# Patient Record
Sex: MALE | Race: WHITE | NOT HISPANIC OR LATINO | Employment: FULL TIME | RURAL
[De-identification: names, ages, dates, MRNs, and addresses within clinical notes are randomized per-mention and may not be internally consistent; named-entity substitution may affect disease eponyms.]

---

## 2017-02-27 ENCOUNTER — CONVERSION ENCOUNTER (OUTPATIENT)
Dept: FAMILY MEDICINE CLINIC | Facility: CLINIC | Age: 42
End: 2017-02-27

## 2017-03-07 ENCOUNTER — CONVERSION ENCOUNTER (OUTPATIENT)
Dept: FAMILY MEDICINE CLINIC | Facility: CLINIC | Age: 42
End: 2017-03-07

## 2017-03-20 ENCOUNTER — CONVERSION ENCOUNTER (OUTPATIENT)
Dept: FAMILY MEDICINE CLINIC | Facility: CLINIC | Age: 42
End: 2017-03-20

## 2017-04-12 ENCOUNTER — CONVERSION ENCOUNTER (OUTPATIENT)
Dept: FAMILY MEDICINE CLINIC | Facility: CLINIC | Age: 42
End: 2017-04-12

## 2017-06-12 ENCOUNTER — CONVERSION ENCOUNTER (OUTPATIENT)
Dept: FAMILY MEDICINE CLINIC | Facility: CLINIC | Age: 42
End: 2017-06-12

## 2017-06-13 ENCOUNTER — CONVERSION ENCOUNTER (OUTPATIENT)
Dept: FAMILY MEDICINE CLINIC | Facility: CLINIC | Age: 42
End: 2017-06-13

## 2017-06-20 ENCOUNTER — CONVERSION ENCOUNTER (OUTPATIENT)
Dept: FAMILY MEDICINE CLINIC | Facility: CLINIC | Age: 42
End: 2017-06-20

## 2017-07-17 ENCOUNTER — CONVERSION ENCOUNTER (OUTPATIENT)
Dept: FAMILY MEDICINE CLINIC | Facility: CLINIC | Age: 42
End: 2017-07-17

## 2017-08-02 ENCOUNTER — CONVERSION ENCOUNTER (OUTPATIENT)
Dept: FAMILY MEDICINE CLINIC | Facility: CLINIC | Age: 42
End: 2017-08-02

## 2017-12-06 ENCOUNTER — CONVERSION ENCOUNTER (OUTPATIENT)
Dept: FAMILY MEDICINE CLINIC | Facility: CLINIC | Age: 42
End: 2017-12-06

## 2018-01-08 ENCOUNTER — CONVERSION ENCOUNTER (OUTPATIENT)
Dept: FAMILY MEDICINE CLINIC | Facility: CLINIC | Age: 43
End: 2018-01-08

## 2018-04-16 ENCOUNTER — CONVERSION ENCOUNTER (OUTPATIENT)
Dept: FAMILY MEDICINE CLINIC | Facility: CLINIC | Age: 43
End: 2018-04-16

## 2018-04-23 ENCOUNTER — CONVERSION ENCOUNTER (OUTPATIENT)
Dept: FAMILY MEDICINE CLINIC | Facility: CLINIC | Age: 43
End: 2018-04-23

## 2018-04-26 ENCOUNTER — CONVERSION ENCOUNTER (OUTPATIENT)
Dept: FAMILY MEDICINE CLINIC | Facility: CLINIC | Age: 43
End: 2018-04-26

## 2018-06-12 ENCOUNTER — CONVERSION ENCOUNTER (OUTPATIENT)
Dept: FAMILY MEDICINE CLINIC | Facility: CLINIC | Age: 43
End: 2018-06-12

## 2018-06-17 ENCOUNTER — CONVERSION ENCOUNTER (OUTPATIENT)
Dept: FAMILY MEDICINE CLINIC | Facility: CLINIC | Age: 43
End: 2018-06-17

## 2018-06-20 ENCOUNTER — HOSPITAL ENCOUNTER (OUTPATIENT)
Dept: MRI IMAGING | Facility: HOSPITAL | Age: 43
Discharge: HOME | End: 2018-06-20
Attending: FAMILY MEDICINE | Admitting: FAMILY MEDICINE

## 2018-07-02 ENCOUNTER — CONVERSION ENCOUNTER (OUTPATIENT)
Dept: FAMILY MEDICINE CLINIC | Facility: CLINIC | Age: 43
End: 2018-07-02

## 2018-07-06 ENCOUNTER — TELEPHONE (OUTPATIENT)
Dept: NEUROSURGERY | Facility: CLINIC | Age: 43
End: 2018-07-06

## 2018-07-06 ENCOUNTER — OFFICE VISIT (OUTPATIENT)
Dept: NEUROSURGERY | Facility: CLINIC | Age: 43
End: 2018-07-06

## 2018-07-06 VITALS
BODY MASS INDEX: 39.51 KG/M2 | SYSTOLIC BLOOD PRESSURE: 118 MMHG | HEIGHT: 70 IN | HEART RATE: 72 BPM | DIASTOLIC BLOOD PRESSURE: 70 MMHG | WEIGHT: 276 LBS

## 2018-07-06 DIAGNOSIS — M54.12 CERVICAL RADICULOPATHY: Primary | ICD-10-CM

## 2018-07-06 DIAGNOSIS — Z98.890 POST-OPERATIVE STATE: ICD-10-CM

## 2018-07-06 PROCEDURE — 99243 OFF/OP CNSLTJ NEW/EST LOW 30: CPT | Performed by: NEUROLOGICAL SURGERY

## 2018-07-06 RX ORDER — GABAPENTIN 300 MG/1
300 CAPSULE ORAL 3 TIMES DAILY
Qty: 90 CAPSULE | Refills: 3
Start: 2018-07-06 | End: 2018-07-11

## 2018-07-06 RX ORDER — HYDROCODONE BITARTRATE AND ACETAMINOPHEN 7.5; 325 MG/1; MG/1
1 TABLET ORAL EVERY 6 HOURS PRN
COMMUNITY
Start: 2018-07-02 | End: 2018-07-15 | Stop reason: HOSPADM

## 2018-07-06 RX ORDER — CEFAZOLIN SODIUM IN 0.9 % NACL 3 G/100 ML
3 INTRAVENOUS SOLUTION, PIGGYBACK (ML) INTRAVENOUS ONCE
Status: CANCELLED | OUTPATIENT
Start: 2018-07-13 | End: 2018-07-13

## 2018-07-06 NOTE — PROGRESS NOTES
Subjective   Patient ID: Brad A Schoen is a 42 y.o. male who is being seen for consultation today at the request of Reggie Duane Lyell, MD for cervical stenosis. He had an MRI of the cervical spine at [facility] on 6/20/18. He presents unaccompanied.    History of Present Illness 42 [employer] employee with 3 months of neck issues with some progression of right arm involvement and weakness x 1 month.  No loss of b/b.  He reports no numbness.  He has limited right deltoid function 3-4 weeks.  He also reports severe pain in the arm.  He feels his symptoms are static for the last month.  He has had 2 cher's withut efficacy.  He is taking some narcotics with limited efficacy as well.  He denies NSAID use.  He is not a tobacco user.  His pain is 9/10 today in the clinic.      The following portions of the patient's history were reviewed and updated as appropriate: allergies, current medications, past family history, past medical history, past social history, past surgical history and problem list.    Review of Systems   Constitutional: Negative for chills and fever.   Respiratory: Negative for cough and shortness of breath.    Cardiovascular: Negative for chest pain, palpitations and leg swelling.   Gastrointestinal: Negative for abdominal pain and constipation.   Genitourinary: Negative for difficulty urinating and enuresis. Penile pain: RUE.   Musculoskeletal: Positive for arthralgias (RUE) and neck pain. Negative for back pain and gait problem.   Skin: Negative for rash.   Neurological: Positive for weakness. Negative for numbness (or tingling).   Hematological: Does not bruise/bleed easily.   Psychiatric/Behavioral: Negative for sleep disturbance.       Objective   Physical Exam   Constitutional: He is oriented to person, place, and time.   Neurological: He is oriented to person, place, and time. Gait normal.   Reflex Scores:       Tricep reflexes are 2+ on the right side and 2+ on the left side.       Bicep reflexes are  1+ on the right side.       Brachioradialis reflexes are 2+ on the right side and 2+ on the left side.       Patellar reflexes are 2+ on the right side and 2+ on the left side.       Achilles reflexes are 2+ on the right side and 2+ on the left side.    Neurologic Exam     Mental Status   Oriented to person, place, and time.     Motor Exam   Muscle bulk: normal    Strength   Right deltoid: 3/5  Left deltoid: 5/5  Right biceps: 4/5  Left biceps: 5/5  Right triceps: 5/5  Left triceps: 5/5  Right wrist flexion: 5/5  Left wrist flexion: 5/5  Right interossei: 5/5  Left interossei: 5/5  Right iliopsoas: 5/5  Left iliopsoas: 5/5  Right quadriceps: 5/5  Left quadriceps: 5/5  Right hamstrin/5  Left hamstrin/5  Right anterior tibial: 5/5  Left anterior tibial: 5/5  Right gastroc: 5/5  Left gastroc: 5/55/5 EHL     Sensory Exam   Right arm light touch: normal  Left arm light touch: normal  Right leg light touch: normal  Left leg light touch: normal  Right arm pinprick: normal  Left arm pinprick: normal  Right leg pinprick: normal  Left leg pinprick: normal    Gait, Coordination, and Reflexes     Gait  Gait: normal    Reflexes   Right brachioradialis: 2+  Left brachioradialis: 2+  Right biceps: 1+  Right triceps: 2+  Left triceps: 2+  Right patellar: 2+  Left patellar: 2+  Right achilles: 2+  Left achilles: 2+  Right Anaya: absent  Left Anaya: absent  Right ankle clonus: absent  Left ankle clonus: absent         Assessment/Plan   Independent Review of Radiographic Studies:  c45 and c56 right sided disc /osteophytre complexes.  C56 is broad based.  No cord contusion is noted.  Xrays reveal     Medical Decision Making:  Patient has static and significant weakness and pain.  He needs surgery to improve his chance of recovery.  We discussed this will either be a corpectomy and fusion or 2 level ACDF dictated by the results of a CT scan.  We discussed red flags. He will require a hard collar post op.  We discussed  adjacent segment disease, dysphagia, csf leakage, infection, weakness, spinal cord injury, need for transfusion, swallowing issues or hoarseness.  We discussed perioperative restrictions and work issues in detail.  We will start some gabapentin to see if we can assist his pain while operative planning and finalization is occurring.  He is to report to the ER if his weakness worsens and understands this but as he has static deficits this is not emergent.      Chu was seen today for neck pain and arm pain.    Diagnoses and all orders for this visit:    Cervical radiculopathy  -     CT Cervical Spine Without Contrast; Future  -     Case Request; Standing  -     CBC and Differential; Future  -     Comprehensive metabolic panel; Future  -     APTT; Future  -     Protime-INR; Future  -     ceFAZolin (ANCEF) 3 g in sodium chloride 0.9 % 100 mL IVPB; Infuse 3 g into a venous catheter 1 (One) Time.  -     Case Request    Other orders  -     Follow anesthesia standing orders.  -     Obtain informed consent  -     Follow anesthesia standing orders.; Standing  -     Verify NPO Status; Standing  -     SCD (sequential compression device)- to be placed on patient in Pre-op; Standing  -     Provide NPO Instructions to Patient; Future      No Follow-up on file.

## 2018-07-06 NOTE — TELEPHONE ENCOUNTER
Can you please enter a POST OP cervical xray AP/LAT with diagnosis cervical radiculopathy. Post op appt is with Torie on July 26 at 2:00. Thanks!

## 2018-07-11 ENCOUNTER — APPOINTMENT (OUTPATIENT)
Dept: PREADMISSION TESTING | Facility: HOSPITAL | Age: 43
End: 2018-07-11

## 2018-07-11 ENCOUNTER — HOSPITAL ENCOUNTER (OUTPATIENT)
Dept: GENERAL RADIOLOGY | Facility: HOSPITAL | Age: 43
Discharge: HOME | End: 2018-07-11

## 2018-07-11 ENCOUNTER — HOSPITAL ENCOUNTER (OUTPATIENT)
Dept: CT IMAGING | Facility: HOSPITAL | Age: 43
Discharge: HOME | End: 2018-07-11
Attending: NEUROLOGICAL SURGERY | Admitting: NEUROLOGICAL SURGERY

## 2018-07-11 VITALS
RESPIRATION RATE: 20 BRPM | WEIGHT: 280 LBS | DIASTOLIC BLOOD PRESSURE: 90 MMHG | SYSTOLIC BLOOD PRESSURE: 122 MMHG | HEART RATE: 84 BPM | TEMPERATURE: 98.2 F | BODY MASS INDEX: 43.95 KG/M2 | HEIGHT: 67 IN | OXYGEN SATURATION: 96 %

## 2018-07-11 DIAGNOSIS — Z98.890 POST-OPERATIVE STATE: ICD-10-CM

## 2018-07-11 DIAGNOSIS — M54.12 CERVICAL RADICULOPATHY: ICD-10-CM

## 2018-07-11 LAB
ALBUMIN SERPL-MCNC: 4.3 G/DL (ref 3.5–5.2)
ALBUMIN/GLOB SERPL: 1.5 G/DL
ALP SERPL-CCNC: 74 U/L (ref 39–117)
ALT SERPL W P-5'-P-CCNC: 36 U/L (ref 1–41)
ANION GAP SERPL CALCULATED.3IONS-SCNC: 14 MMOL/L
APTT PPP: 26.5 SECONDS (ref 22.7–35.4)
AST SERPL-CCNC: 15 U/L (ref 1–40)
BASOPHILS # BLD AUTO: 0.02 10*3/MM3 (ref 0–0.2)
BASOPHILS NFR BLD AUTO: 0.3 % (ref 0–1.5)
BILIRUB SERPL-MCNC: 0.2 MG/DL (ref 0.1–1.2)
BUN BLD-MCNC: 18 MG/DL (ref 6–20)
BUN/CREAT SERPL: 25 (ref 7–25)
CALCIUM SPEC-SCNC: 9.5 MG/DL (ref 8.6–10.5)
CHLORIDE SERPL-SCNC: 102 MMOL/L (ref 98–107)
CO2 SERPL-SCNC: 21 MMOL/L (ref 22–29)
CREAT BLD-MCNC: 0.72 MG/DL (ref 0.76–1.27)
DEPRECATED RDW RBC AUTO: 49.4 FL (ref 37–54)
EOSINOPHIL # BLD AUTO: 0.32 10*3/MM3 (ref 0–0.7)
EOSINOPHIL NFR BLD AUTO: 4.1 % (ref 0.3–6.2)
ERYTHROCYTE [DISTWIDTH] IN BLOOD BY AUTOMATED COUNT: 13.7 % (ref 11.5–14.5)
GFR SERPL CREATININE-BSD FRML MDRD: 119 ML/MIN/1.73
GLOBULIN UR ELPH-MCNC: 2.8 GM/DL
GLUCOSE BLD-MCNC: 105 MG/DL (ref 65–99)
HCT VFR BLD AUTO: 46.3 % (ref 40.4–52.2)
HGB BLD-MCNC: 15.9 G/DL (ref 13.7–17.6)
IMM GRANULOCYTES # BLD: 0.02 10*3/MM3 (ref 0–0.03)
IMM GRANULOCYTES NFR BLD: 0.3 % (ref 0–0.5)
INR PPP: 0.92 (ref 0.9–1.1)
LYMPHOCYTES # BLD AUTO: 1.4 10*3/MM3 (ref 0.9–4.8)
LYMPHOCYTES NFR BLD AUTO: 17.7 % (ref 19.6–45.3)
MCH RBC QN AUTO: 34 PG (ref 27–32.7)
MCHC RBC AUTO-ENTMCNC: 34.3 G/DL (ref 32.6–36.4)
MCV RBC AUTO: 98.9 FL (ref 79.8–96.2)
MONOCYTES # BLD AUTO: 0.72 10*3/MM3 (ref 0.2–1.2)
MONOCYTES NFR BLD AUTO: 9.1 % (ref 5–12)
NEUTROPHILS # BLD AUTO: 5.43 10*3/MM3 (ref 1.9–8.1)
NEUTROPHILS NFR BLD AUTO: 68.8 % (ref 42.7–76)
PLATELET # BLD AUTO: 404 10*3/MM3 (ref 140–500)
PMV BLD AUTO: 9.7 FL (ref 6–12)
POTASSIUM BLD-SCNC: 4.1 MMOL/L (ref 3.5–5.2)
PROT SERPL-MCNC: 7.1 G/DL (ref 6–8.5)
PROTHROMBIN TIME: 12.2 SECONDS (ref 11.7–14.2)
RBC # BLD AUTO: 4.68 10*6/MM3 (ref 4.6–6)
SODIUM BLD-SCNC: 137 MMOL/L (ref 136–145)
WBC NRBC COR # BLD: 7.89 10*3/MM3 (ref 4.5–10.7)

## 2018-07-11 PROCEDURE — 85610 PROTHROMBIN TIME: CPT | Performed by: NEUROLOGICAL SURGERY

## 2018-07-11 PROCEDURE — 80053 COMPREHEN METABOLIC PANEL: CPT | Performed by: NEUROLOGICAL SURGERY

## 2018-07-11 PROCEDURE — 72040 X-RAY EXAM NECK SPINE 2-3 VW: CPT

## 2018-07-11 PROCEDURE — 72125 CT NECK SPINE W/O DYE: CPT

## 2018-07-11 PROCEDURE — 85025 COMPLETE CBC W/AUTO DIFF WBC: CPT | Performed by: NEUROLOGICAL SURGERY

## 2018-07-11 PROCEDURE — 85730 THROMBOPLASTIN TIME PARTIAL: CPT | Performed by: NEUROLOGICAL SURGERY

## 2018-07-11 PROCEDURE — 36415 COLL VENOUS BLD VENIPUNCTURE: CPT

## 2018-07-11 RX ORDER — GABAPENTIN 300 MG/1
300 CAPSULE ORAL 3 TIMES DAILY
COMMUNITY
End: 2020-11-04

## 2018-07-11 NOTE — DISCHARGE INSTRUCTIONS
Take the following medications the morning of surgery with a small sip of water:  none        General Instructions:  • Do not eat solid food after midnight the night before surgery.  • You may drink clear liquids day of surgery but must stop at least one hour before your hospital arrival time.  • It is beneficial for you to have a clear drink that contains carbohydrates the day of surgery.  We suggest a 12 to 20 ounce bottle of Gatorade or Powerade for non-diabetic patients or a 12 to 20 ounce bottle of G2 or Powerade Zero for diabetic patients. (Pediatric patients, are not advised to drink a 12 to 20 ounce carbohydrate drink)    Clear liquids are liquids you can see through.  Nothing red in color.     Plain water                               Sports drinks  Sodas                                   Gelatin (Jell-O)  Fruit juices without pulp such as white grape juice and apple juice  Popsicles that contain no fruit or yogurt  Tea or coffee (no cream or milk added)  Gatorade / Powerade  G2 / Powerade Zero    • Infants may have breast milk up to four hours before surgery.  • Infants drinking formula may drink formula up to six hours before surgery.   • Patients who avoid smoking, chewing tobacco and alcohol for 4 weeks prior to surgery have a reduced risk of post-operative complications.  Quit smoking as many days before surgery as you can.  • Do not smoke, use chewing tobacco or drink alcohol the day of surgery.   • If applicable bring your C-PAP/ BI-PAP machine.  • Bring any papers given to you in the doctor’s office.  • Wear clean comfortable clothes and socks.  • Do not wear contact lenses or make-up.  Bring a case for your glasses.   • Bring crutches or walker if applicable.  • Remove all piercings.  Leave jewelry and any other valuables at home.  • Hair extensions with metal clips must be removed prior to surgery.  • The Pre-Admission Testing nurse will instruct you to bring medications if unable to obtain an  accurate list in Pre-Admission Testing.        If you were given a blood bank ID arm band remember to bring it with you the day of surgery.    Preventing a Surgical Site Infection:  • For 2 to 3 days before surgery, avoid shaving with a razor because the razor can irritate skin and make it easier to develop an infection.    • Any areas of open skin can increase the risk of a post-operative wound infection by allowing bacteria to enter and travel throughout the body.  Notify your surgeon if you have any skin wounds / rashes even if it is not near the expected surgical site.  The area will need assessed to determine if surgery should be delayed until it is healed.  • The night prior to surgery sleep in a clean bed with clean clothing.  Do not allow pets to sleep with you.  • Shower on the morning of surgery using a fresh bar of anti-bacterial soap (such as Dial) and clean washcloth.  Dry with a clean towel and dress in clean clothing.  • Ask your surgeon if you will be receiving antibiotics prior to surgery.  • Make sure you, your family, and all healthcare providers clean their hands with soap and water or an alcohol based hand  before caring for you or your wound.    Day of surgery:  Upon arrival, a Pre-op nurse and Anesthesiologist will review your health history, obtain vital signs, and answer questions you may have.  The only belongings needed at this time will be your home medications and if applicable your C-PAP/BI-PAP machine.  If you are staying overnight your family can leave the rest of your belongings in the car and bring them to your room later.  A Pre-op nurse will start an IV and you may receive medication in preparation for surgery, including something to help you relax.  Your family will be able to see you in the Pre-op area.  While you are in surgery your family should notify the waiting room  if they leave the waiting room area and provide a contact phone number.    Please be  aware that surgery does come with discomfort.  We want to make every effort to control your discomfort so please discuss any uncontrolled symptoms with your nurse.   Your doctor will most likely have prescribed pain medications.      If you are going home after surgery you will receive individualized written care instructions before being discharged.  A responsible adult must drive you to and from the hospital on the day of your surgery and stay with you for 24 hours.    If you are staying overnight following surgery, you will be transported to your hospital room following the recovery period.  [facility] has all private rooms.    You have received a list of surgical assistants for your reference.  If you have any questions please call Pre-Admission Testing at [phone].  Deductibles and co-payments are collected on the day of service. Please be prepared to pay the required co-pay, deductible or deposit on the day of service as defined by your plan.

## 2018-07-13 ENCOUNTER — ANESTHESIA EVENT (OUTPATIENT)
Dept: PERIOP | Facility: HOSPITAL | Age: 43
End: 2018-07-13

## 2018-07-13 ENCOUNTER — ANESTHESIA (OUTPATIENT)
Dept: PERIOP | Facility: HOSPITAL | Age: 43
End: 2018-07-13

## 2018-07-13 ENCOUNTER — APPOINTMENT (OUTPATIENT)
Dept: GENERAL RADIOLOGY | Facility: HOSPITAL | Age: 43
End: 2018-07-13

## 2018-07-13 ENCOUNTER — HOSPITAL ENCOUNTER (INPATIENT)
Facility: HOSPITAL | Age: 43
LOS: 2 days | Discharge: HOME | End: 2018-07-15
Attending: NEUROLOGICAL SURGERY | Admitting: NEUROLOGICAL SURGERY

## 2018-07-13 ENCOUNTER — APPOINTMENT (OUTPATIENT)
Dept: GENERAL RADIOLOGY | Facility: HOSPITAL | Age: 43
End: 2018-07-13
Attending: NEUROLOGICAL SURGERY

## 2018-07-13 DIAGNOSIS — M54.12 CERVICAL RADICULOPATHY: ICD-10-CM

## 2018-07-13 PROCEDURE — 25010000002 HYDROMORPHONE PER 4 MG: Performed by: NURSE ANESTHETIST, CERTIFIED REGISTERED

## 2018-07-13 PROCEDURE — 20931 SP BONE ALGRFT STRUCT ADD-ON: CPT | Performed by: NEUROLOGICAL SURGERY

## 2018-07-13 PROCEDURE — G0378 HOSPITAL OBSERVATION PER HR: HCPCS

## 2018-07-13 PROCEDURE — C1713 ANCHOR/SCREW BN/BN,TIS/BN: HCPCS | Performed by: NEUROLOGICAL SURGERY

## 2018-07-13 PROCEDURE — 25010000002 PHENYLEPHRINE PER 1 ML: Performed by: NURSE ANESTHETIST, CERTIFIED REGISTERED

## 2018-07-13 PROCEDURE — 0RB30ZZ EXCISION OF CERVICAL VERTEBRAL DISC, OPEN APPROACH: ICD-10-PCS | Performed by: NEUROLOGICAL SURGERY

## 2018-07-13 PROCEDURE — 22552 ARTHRD ANT NTRBD CERVICAL EA: CPT | Performed by: NEUROLOGICAL SURGERY

## 2018-07-13 PROCEDURE — 72040 X-RAY EXAM NECK SPINE 2-3 VW: CPT

## 2018-07-13 PROCEDURE — 25010000002 MORPHINE PER 10 MG: Performed by: NEUROLOGICAL SURGERY

## 2018-07-13 PROCEDURE — 25010000002 DEXAMETHASONE PER 1 MG: Performed by: NURSE ANESTHETIST, CERTIFIED REGISTERED

## 2018-07-13 PROCEDURE — 25010000002 VANCOMYCIN PER 500 MG: Performed by: NEUROLOGICAL SURGERY

## 2018-07-13 PROCEDURE — 22845 INSERT SPINE FIXATION DEVICE: CPT | Performed by: SPECIALIST/TECHNOLOGIST, OTHER

## 2018-07-13 PROCEDURE — 22551 ARTHRD ANT NTRBDY CERVICAL: CPT | Performed by: SPECIALIST/TECHNOLOGIST, OTHER

## 2018-07-13 PROCEDURE — 0RG20K0 FUSION OF 2 OR MORE CERVICAL VERTEBRAL JOINTS WITH NONAUTOLOGOUS TISSUE SUBSTITUTE, ANTERIOR APPROACH, ANTERIOR COLUMN, OPEN APPROACH: ICD-10-PCS | Performed by: NEUROLOGICAL SURGERY

## 2018-07-13 PROCEDURE — 22552 ARTHRD ANT NTRBD CERVICAL EA: CPT | Performed by: SPECIALIST/TECHNOLOGIST, OTHER

## 2018-07-13 PROCEDURE — 76000 FLUOROSCOPY <1 HR PHYS/QHP: CPT

## 2018-07-13 PROCEDURE — 25010000002 FENTANYL CITRATE (PF) 100 MCG/2ML SOLUTION: Performed by: NURSE ANESTHETIST, CERTIFIED REGISTERED

## 2018-07-13 PROCEDURE — 25010000002 PROPOFOL 10 MG/ML EMULSION: Performed by: NURSE ANESTHETIST, CERTIFIED REGISTERED

## 2018-07-13 PROCEDURE — 25010000002 CEFAZOLIN PER 500 MG: Performed by: NEUROLOGICAL SURGERY

## 2018-07-13 PROCEDURE — 22551 ARTHRD ANT NTRBDY CERVICAL: CPT | Performed by: NEUROLOGICAL SURGERY

## 2018-07-13 PROCEDURE — 25010000002 ONDANSETRON PER 1 MG: Performed by: NURSE ANESTHETIST, CERTIFIED REGISTERED

## 2018-07-13 PROCEDURE — L0174 CERV SR 2PC THOR EXT PRE OTS: HCPCS | Performed by: NEUROLOGICAL SURGERY

## 2018-07-13 PROCEDURE — 20930 SP BONE ALGRFT MORSEL ADD-ON: CPT | Performed by: NEUROLOGICAL SURGERY

## 2018-07-13 PROCEDURE — 22845 INSERT SPINE FIXATION DEVICE: CPT | Performed by: NEUROLOGICAL SURGERY

## 2018-07-13 PROCEDURE — 25010000002 MIDAZOLAM PER 1 MG: Performed by: ANESTHESIOLOGY

## 2018-07-13 DEVICE — PLATE 7200047 ATL VISION ELITE 47.5MM
Type: IMPLANTABLE DEVICE | Site: SPINE CERVICAL | Status: FUNCTIONAL
Brand: ATLANTIS® ANTERIOR CERVICAL PLATE SYSTEM

## 2018-07-13 DEVICE — DBM T43102 1CC GRAFTON PUTTY
Type: IMPLANTABLE DEVICE | Site: SPINE CERVICAL | Status: FUNCTIONAL
Brand: GRAFTON®AND GRAFTON PLUS®DEMINERALIZED BONE MATRIX (DBM)

## 2018-07-13 DEVICE — ALLOGRFT BONE CORNERSTONE L/ASR FZD 6DEG 7X14X11M: Type: IMPLANTABLE DEVICE | Site: SPINE CERVICAL | Status: FUNCTIONAL

## 2018-07-13 DEVICE — WAX BONE HEMO NAT 2.5G: Type: IMPLANTABLE DEVICE | Status: FUNCTIONAL

## 2018-07-13 RX ORDER — CEFAZOLIN SODIUM IN 0.9 % NACL 3 G/100 ML
3 INTRAVENOUS SOLUTION, PIGGYBACK (ML) INTRAVENOUS ONCE
Status: COMPLETED | OUTPATIENT
Start: 2018-07-13 | End: 2018-07-13

## 2018-07-13 RX ORDER — BISACODYL 10 MG
10 SUPPOSITORY, RECTAL RECTAL DAILY PRN
Status: DISCONTINUED | OUTPATIENT
Start: 2018-07-13 | End: 2018-07-15 | Stop reason: HOSPADM

## 2018-07-13 RX ORDER — MORPHINE SULFATE 2 MG/ML
2 INJECTION, SOLUTION INTRAMUSCULAR; INTRAVENOUS
Status: DISCONTINUED | OUTPATIENT
Start: 2018-07-13 | End: 2018-07-15 | Stop reason: HOSPADM

## 2018-07-13 RX ORDER — NALOXONE HCL 0.4 MG/ML
0.4 VIAL (ML) INJECTION
Status: DISCONTINUED | OUTPATIENT
Start: 2018-07-13 | End: 2018-07-15 | Stop reason: HOSPADM

## 2018-07-13 RX ORDER — MORPHINE SULFATE 2 MG/ML
2 INJECTION, SOLUTION INTRAMUSCULAR; INTRAVENOUS EVERY 4 HOURS PRN
Status: DISCONTINUED | OUTPATIENT
Start: 2018-07-13 | End: 2018-07-13

## 2018-07-13 RX ORDER — KETAMINE HYDROCHLORIDE 10 MG/ML
INJECTION INTRAMUSCULAR; INTRAVENOUS AS NEEDED
Status: DISCONTINUED | OUTPATIENT
Start: 2018-07-13 | End: 2018-07-13 | Stop reason: SURG

## 2018-07-13 RX ORDER — METHOCARBAMOL 500 MG/1
1000 TABLET, FILM COATED ORAL 4 TIMES DAILY PRN
Status: DISCONTINUED | OUTPATIENT
Start: 2018-07-13 | End: 2018-07-15 | Stop reason: HOSPADM

## 2018-07-13 RX ORDER — PROMETHAZINE HYDROCHLORIDE 25 MG/1
12.5 TABLET ORAL ONCE AS NEEDED
Status: DISCONTINUED | OUTPATIENT
Start: 2018-07-13 | End: 2018-07-13 | Stop reason: HOSPADM

## 2018-07-13 RX ORDER — ROCURONIUM BROMIDE 10 MG/ML
INJECTION, SOLUTION INTRAVENOUS AS NEEDED
Status: DISCONTINUED | OUTPATIENT
Start: 2018-07-13 | End: 2018-07-13 | Stop reason: SURG

## 2018-07-13 RX ORDER — LIDOCAINE HYDROCHLORIDE 20 MG/ML
INJECTION, SOLUTION INFILTRATION; PERINEURAL AS NEEDED
Status: DISCONTINUED | OUTPATIENT
Start: 2018-07-13 | End: 2018-07-13 | Stop reason: SURG

## 2018-07-13 RX ORDER — SODIUM CHLORIDE, SODIUM LACTATE, POTASSIUM CHLORIDE, CALCIUM CHLORIDE 600; 310; 30; 20 MG/100ML; MG/100ML; MG/100ML; MG/100ML
9 INJECTION, SOLUTION INTRAVENOUS CONTINUOUS
Status: DISCONTINUED | OUTPATIENT
Start: 2018-07-13 | End: 2018-07-13

## 2018-07-13 RX ORDER — MAGNESIUM HYDROXIDE 1200 MG/15ML
LIQUID ORAL AS NEEDED
Status: DISCONTINUED | OUTPATIENT
Start: 2018-07-13 | End: 2018-07-13 | Stop reason: HOSPADM

## 2018-07-13 RX ORDER — PROMETHAZINE HYDROCHLORIDE 25 MG/1
25 TABLET ORAL ONCE AS NEEDED
Status: DISCONTINUED | OUTPATIENT
Start: 2018-07-13 | End: 2018-07-13 | Stop reason: HOSPADM

## 2018-07-13 RX ORDER — HYDROCODONE BITARTRATE AND ACETAMINOPHEN 7.5; 325 MG/1; MG/1
1 TABLET ORAL ONCE AS NEEDED
Status: DISCONTINUED | OUTPATIENT
Start: 2018-07-13 | End: 2018-07-13 | Stop reason: HOSPADM

## 2018-07-13 RX ORDER — SENNA AND DOCUSATE SODIUM 50; 8.6 MG/1; MG/1
1 TABLET, FILM COATED ORAL NIGHTLY PRN
Status: DISCONTINUED | OUTPATIENT
Start: 2018-07-13 | End: 2018-07-15 | Stop reason: HOSPADM

## 2018-07-13 RX ORDER — LABETALOL HYDROCHLORIDE 5 MG/ML
5 INJECTION, SOLUTION INTRAVENOUS
Status: DISCONTINUED | OUTPATIENT
Start: 2018-07-13 | End: 2018-07-13 | Stop reason: HOSPADM

## 2018-07-13 RX ORDER — HYDROMORPHONE HCL 110MG/55ML
PATIENT CONTROLLED ANALGESIA SYRINGE INTRAVENOUS AS NEEDED
Status: DISCONTINUED | OUTPATIENT
Start: 2018-07-13 | End: 2018-07-13 | Stop reason: SURG

## 2018-07-13 RX ORDER — NALOXONE HCL 0.4 MG/ML
0.2 VIAL (ML) INJECTION AS NEEDED
Status: DISCONTINUED | OUTPATIENT
Start: 2018-07-13 | End: 2018-07-13 | Stop reason: HOSPADM

## 2018-07-13 RX ORDER — BISACODYL 5 MG/1
5 TABLET, DELAYED RELEASE ORAL DAILY PRN
Status: DISCONTINUED | OUTPATIENT
Start: 2018-07-13 | End: 2018-07-15 | Stop reason: HOSPADM

## 2018-07-13 RX ORDER — OXYCODONE AND ACETAMINOPHEN 7.5; 325 MG/1; MG/1
1 TABLET ORAL ONCE AS NEEDED
Status: DISCONTINUED | OUTPATIENT
Start: 2018-07-13 | End: 2018-07-13 | Stop reason: HOSPADM

## 2018-07-13 RX ORDER — FLUMAZENIL 0.1 MG/ML
0.2 INJECTION INTRAVENOUS AS NEEDED
Status: DISCONTINUED | OUTPATIENT
Start: 2018-07-13 | End: 2018-07-13 | Stop reason: HOSPADM

## 2018-07-13 RX ORDER — FAMOTIDINE 10 MG/ML
20 INJECTION, SOLUTION INTRAVENOUS ONCE
Status: COMPLETED | OUTPATIENT
Start: 2018-07-13 | End: 2018-07-13

## 2018-07-13 RX ORDER — DIPHENHYDRAMINE HYDROCHLORIDE 50 MG/ML
12.5 INJECTION INTRAMUSCULAR; INTRAVENOUS
Status: DISCONTINUED | OUTPATIENT
Start: 2018-07-13 | End: 2018-07-13 | Stop reason: HOSPADM

## 2018-07-13 RX ORDER — CEFAZOLIN SODIUM IN 0.9 % NACL 3 G/100 ML
3 INTRAVENOUS SOLUTION, PIGGYBACK (ML) INTRAVENOUS EVERY 8 HOURS
Status: COMPLETED | OUTPATIENT
Start: 2018-07-13 | End: 2018-07-14

## 2018-07-13 RX ORDER — DOCUSATE SODIUM 100 MG/1
100 CAPSULE, LIQUID FILLED ORAL 2 TIMES DAILY PRN
Status: DISCONTINUED | OUTPATIENT
Start: 2018-07-13 | End: 2018-07-15 | Stop reason: HOSPADM

## 2018-07-13 RX ORDER — PROPOFOL 10 MG/ML
VIAL (ML) INTRAVENOUS AS NEEDED
Status: DISCONTINUED | OUTPATIENT
Start: 2018-07-13 | End: 2018-07-13 | Stop reason: SURG

## 2018-07-13 RX ORDER — GABAPENTIN 300 MG/1
300 CAPSULE ORAL 3 TIMES DAILY
Status: DISCONTINUED | OUTPATIENT
Start: 2018-07-13 | End: 2018-07-15 | Stop reason: HOSPADM

## 2018-07-13 RX ORDER — ONDANSETRON 2 MG/ML
4 INJECTION INTRAMUSCULAR; INTRAVENOUS EVERY 6 HOURS PRN
Status: DISCONTINUED | OUTPATIENT
Start: 2018-07-13 | End: 2018-07-15 | Stop reason: HOSPADM

## 2018-07-13 RX ORDER — ONDANSETRON 4 MG/1
4 TABLET, FILM COATED ORAL EVERY 6 HOURS PRN
Status: DISCONTINUED | OUTPATIENT
Start: 2018-07-13 | End: 2018-07-15 | Stop reason: HOSPADM

## 2018-07-13 RX ORDER — DEXAMETHASONE SODIUM PHOSPHATE 10 MG/ML
INJECTION INTRAMUSCULAR; INTRAVENOUS AS NEEDED
Status: DISCONTINUED | OUTPATIENT
Start: 2018-07-13 | End: 2018-07-13 | Stop reason: SURG

## 2018-07-13 RX ORDER — ONDANSETRON 2 MG/ML
INJECTION INTRAMUSCULAR; INTRAVENOUS AS NEEDED
Status: DISCONTINUED | OUTPATIENT
Start: 2018-07-13 | End: 2018-07-13 | Stop reason: SURG

## 2018-07-13 RX ORDER — ONDANSETRON 4 MG/1
4 TABLET, ORALLY DISINTEGRATING ORAL EVERY 6 HOURS PRN
Status: DISCONTINUED | OUTPATIENT
Start: 2018-07-13 | End: 2018-07-15 | Stop reason: HOSPADM

## 2018-07-13 RX ORDER — MIDAZOLAM HYDROCHLORIDE 1 MG/ML
2 INJECTION INTRAMUSCULAR; INTRAVENOUS
Status: DISCONTINUED | OUTPATIENT
Start: 2018-07-13 | End: 2018-07-13 | Stop reason: HOSPADM

## 2018-07-13 RX ORDER — MIDAZOLAM HYDROCHLORIDE 1 MG/ML
1 INJECTION INTRAMUSCULAR; INTRAVENOUS
Status: DISCONTINUED | OUTPATIENT
Start: 2018-07-13 | End: 2018-07-13 | Stop reason: HOSPADM

## 2018-07-13 RX ORDER — SODIUM CHLORIDE 0.9 % (FLUSH) 0.9 %
1-10 SYRINGE (ML) INJECTION AS NEEDED
Status: DISCONTINUED | OUTPATIENT
Start: 2018-07-13 | End: 2018-07-13 | Stop reason: HOSPADM

## 2018-07-13 RX ORDER — PROMETHAZINE HYDROCHLORIDE 25 MG/ML
12.5 INJECTION, SOLUTION INTRAMUSCULAR; INTRAVENOUS ONCE AS NEEDED
Status: DISCONTINUED | OUTPATIENT
Start: 2018-07-13 | End: 2018-07-13 | Stop reason: HOSPADM

## 2018-07-13 RX ORDER — PROMETHAZINE HYDROCHLORIDE 25 MG/1
25 SUPPOSITORY RECTAL ONCE AS NEEDED
Status: DISCONTINUED | OUTPATIENT
Start: 2018-07-13 | End: 2018-07-13 | Stop reason: HOSPADM

## 2018-07-13 RX ORDER — SODIUM CHLORIDE 0.9 % (FLUSH) 0.9 %
1-10 SYRINGE (ML) INJECTION AS NEEDED
Status: DISCONTINUED | OUTPATIENT
Start: 2018-07-13 | End: 2018-07-15 | Stop reason: HOSPADM

## 2018-07-13 RX ORDER — NALOXONE HCL 0.4 MG/ML
0.4 VIAL (ML) INJECTION
Status: DISCONTINUED | OUTPATIENT
Start: 2018-07-13 | End: 2018-07-13

## 2018-07-13 RX ORDER — EPHEDRINE SULFATE 50 MG/ML
INJECTION, SOLUTION INTRAVENOUS AS NEEDED
Status: DISCONTINUED | OUTPATIENT
Start: 2018-07-13 | End: 2018-07-13 | Stop reason: SURG

## 2018-07-13 RX ORDER — HYDROCODONE BITARTRATE AND ACETAMINOPHEN 7.5; 325 MG/1; MG/1
1 TABLET ORAL EVERY 4 HOURS PRN
Status: DISCONTINUED | OUTPATIENT
Start: 2018-07-13 | End: 2018-07-14

## 2018-07-13 RX ORDER — HYDROMORPHONE HYDROCHLORIDE 1 MG/ML
0.5 INJECTION, SOLUTION INTRAMUSCULAR; INTRAVENOUS; SUBCUTANEOUS
Status: DISCONTINUED | OUTPATIENT
Start: 2018-07-13 | End: 2018-07-13 | Stop reason: HOSPADM

## 2018-07-13 RX ORDER — FENTANYL CITRATE 50 UG/ML
INJECTION, SOLUTION INTRAMUSCULAR; INTRAVENOUS AS NEEDED
Status: DISCONTINUED | OUTPATIENT
Start: 2018-07-13 | End: 2018-07-13 | Stop reason: SURG

## 2018-07-13 RX ORDER — FENTANYL CITRATE 50 UG/ML
50 INJECTION, SOLUTION INTRAMUSCULAR; INTRAVENOUS
Status: DISCONTINUED | OUTPATIENT
Start: 2018-07-13 | End: 2018-07-13 | Stop reason: HOSPADM

## 2018-07-13 RX ORDER — EPHEDRINE SULFATE 50 MG/ML
5 INJECTION, SOLUTION INTRAVENOUS ONCE AS NEEDED
Status: DISCONTINUED | OUTPATIENT
Start: 2018-07-13 | End: 2018-07-13 | Stop reason: HOSPADM

## 2018-07-13 RX ORDER — ONDANSETRON 2 MG/ML
4 INJECTION INTRAMUSCULAR; INTRAVENOUS ONCE AS NEEDED
Status: DISCONTINUED | OUTPATIENT
Start: 2018-07-13 | End: 2018-07-13 | Stop reason: HOSPADM

## 2018-07-13 RX ADMIN — FENTANYL CITRATE 100 MCG: 50 INJECTION, SOLUTION INTRAMUSCULAR; INTRAVENOUS at 12:46

## 2018-07-13 RX ADMIN — SODIUM CHLORIDE, POTASSIUM CHLORIDE, SODIUM LACTATE AND CALCIUM CHLORIDE: 600; 310; 30; 20 INJECTION, SOLUTION INTRAVENOUS at 13:46

## 2018-07-13 RX ADMIN — HYDROMORPHONE HYDROCHLORIDE 0.5 MG: 1 INJECTION, SOLUTION INTRAMUSCULAR; INTRAVENOUS; SUBCUTANEOUS at 16:53

## 2018-07-13 RX ADMIN — SODIUM CHLORIDE, POTASSIUM CHLORIDE, SODIUM LACTATE AND CALCIUM CHLORIDE 9 ML/HR: 600; 310; 30; 20 INJECTION, SOLUTION INTRAVENOUS at 17:04

## 2018-07-13 RX ADMIN — EPHEDRINE SULFATE 10 MG: 50 INJECTION INTRAMUSCULAR; INTRAVENOUS; SUBCUTANEOUS at 15:00

## 2018-07-13 RX ADMIN — MIDAZOLAM 2 MG: 1 INJECTION INTRAMUSCULAR; INTRAVENOUS at 09:30

## 2018-07-13 RX ADMIN — PHENYLEPHRINE HYDROCHLORIDE 200 MCG: 10 INJECTION INTRAVENOUS at 13:13

## 2018-07-13 RX ADMIN — LIDOCAINE HYDROCHLORIDE 100 MG: 20 INJECTION, SOLUTION INFILTRATION; PERINEURAL at 12:46

## 2018-07-13 RX ADMIN — CEFAZOLIN 3 G: 1 INJECTION, POWDER, FOR SOLUTION INTRAMUSCULAR; INTRAVENOUS; PARENTERAL at 12:57

## 2018-07-13 RX ADMIN — MORPHINE SULFATE 2 MG: 2 INJECTION, SOLUTION INTRAMUSCULAR; INTRAVENOUS at 18:21

## 2018-07-13 RX ADMIN — DEXAMETHASONE SODIUM PHOSPHATE 10 MG: 10 INJECTION INTRAMUSCULAR; INTRAVENOUS at 12:57

## 2018-07-13 RX ADMIN — CEFAZOLIN SODIUM 3 G: 10 INJECTION, POWDER, FOR SOLUTION INTRAVENOUS at 20:20

## 2018-07-13 RX ADMIN — KETAMINE HYDROCHLORIDE 10 MG: 10 INJECTION INTRAMUSCULAR; INTRAVENOUS at 13:49

## 2018-07-13 RX ADMIN — PHENYLEPHRINE HYDROCHLORIDE 200 MCG: 10 INJECTION INTRAVENOUS at 14:49

## 2018-07-13 RX ADMIN — FAMOTIDINE 20 MG: 10 INJECTION INTRAVENOUS at 09:30

## 2018-07-13 RX ADMIN — HYDROCODONE BITARTRATE AND ACETAMINOPHEN 1 TABLET: 7.5; 325 TABLET ORAL at 18:57

## 2018-07-13 RX ADMIN — FENTANYL CITRATE 50 MCG: 50 INJECTION, SOLUTION INTRAMUSCULAR; INTRAVENOUS at 13:47

## 2018-07-13 RX ADMIN — FENTANYL CITRATE 50 MCG: 50 INJECTION, SOLUTION INTRAMUSCULAR; INTRAVENOUS at 13:31

## 2018-07-13 RX ADMIN — ONDANSETRON 4 MG: 2 INJECTION INTRAMUSCULAR; INTRAVENOUS at 15:38

## 2018-07-13 RX ADMIN — FENTANYL CITRATE 50 MCG: 50 INJECTION, SOLUTION INTRAMUSCULAR; INTRAVENOUS at 14:28

## 2018-07-13 RX ADMIN — METHOCARBAMOL 1000 MG: 500 TABLET ORAL at 23:13

## 2018-07-13 RX ADMIN — KETAMINE HYDROCHLORIDE 10 MG: 10 INJECTION INTRAMUSCULAR; INTRAVENOUS at 14:45

## 2018-07-13 RX ADMIN — SUGAMMADEX 4 ML: 100 INJECTION, SOLUTION INTRAVENOUS at 15:40

## 2018-07-13 RX ADMIN — FENTANYL CITRATE 50 MCG: 50 INJECTION, SOLUTION INTRAMUSCULAR; INTRAVENOUS at 16:39

## 2018-07-13 RX ADMIN — METHOCARBAMOL 1000 MG: 500 TABLET ORAL at 17:23

## 2018-07-13 RX ADMIN — HYDROCODONE BITARTRATE AND ACETAMINOPHEN 1 TABLET: 7.5; 325 TABLET ORAL at 23:13

## 2018-07-13 RX ADMIN — GABAPENTIN 300 MG: 300 CAPSULE ORAL at 20:59

## 2018-07-13 RX ADMIN — PROPOFOL 200 MG: 10 INJECTION, EMULSION INTRAVENOUS at 12:46

## 2018-07-13 RX ADMIN — MORPHINE SULFATE 2 MG: 2 INJECTION, SOLUTION INTRAMUSCULAR; INTRAVENOUS at 20:59

## 2018-07-13 RX ADMIN — KETAMINE HYDROCHLORIDE 10 MG: 10 INJECTION INTRAMUSCULAR; INTRAVENOUS at 15:00

## 2018-07-13 RX ADMIN — ROCURONIUM BROMIDE 40 MG: 10 INJECTION INTRAVENOUS at 12:46

## 2018-07-13 RX ADMIN — GABAPENTIN 300 MG: 300 CAPSULE ORAL at 18:20

## 2018-07-13 RX ADMIN — SODIUM CHLORIDE 0.5 MCG/KG/HR: 900 INJECTION, SOLUTION INTRAVENOUS at 13:02

## 2018-07-13 RX ADMIN — SODIUM CHLORIDE, POTASSIUM CHLORIDE, SODIUM LACTATE AND CALCIUM CHLORIDE 9 ML/HR: 600; 310; 30; 20 INJECTION, SOLUTION INTRAVENOUS at 09:30

## 2018-07-13 RX ADMIN — HYDROMORPHONE HYDROCHLORIDE 1 MG: 2 INJECTION INTRAMUSCULAR; INTRAVENOUS; SUBCUTANEOUS at 15:36

## 2018-07-13 RX ADMIN — HYDROMORPHONE HYDROCHLORIDE 0.5 MG: 1 INJECTION, SOLUTION INTRAMUSCULAR; INTRAVENOUS; SUBCUTANEOUS at 16:22

## 2018-07-13 RX ADMIN — KETAMINE HYDROCHLORIDE 20 MG: 10 INJECTION INTRAMUSCULAR; INTRAVENOUS at 13:05

## 2018-07-13 NOTE — ANESTHESIA PROCEDURE NOTES
Airway  Urgency: elective    Date/Time: 7/13/2018 12:49 PM  Airway not difficult    General Information and Staff    Patient location during procedure: OR  Anesthesiologist: KIKI GARCIA  CRNA: JNAA GRANGER    Indications and Patient Condition  Indications for airway management: airway protection    Preoxygenated: yes  MILS maintained throughout  Mask difficulty assessment: 1 - vent by mask    Final Airway Details  Final airway type: endotracheal airway      Successful airway: ETT  Cuffed: yes   Successful intubation technique: direct laryngoscopy  Facilitating devices/methods: intubating stylet  Endotracheal tube insertion site: oral  Blade: Garcia  Blade size: #2  ETT size: 8.0 mm  Cormack-Lehane Classification: grade I - full view of glottis  Placement verified by: chest auscultation and capnometry   Measured from: lips  ETT to lips (cm): 22  Number of attempts at approach: 1

## 2018-07-13 NOTE — OP NOTE
CERVICAL DISCECTOMY ANTERIOR FUSION WITH INSTRUMENTATION  Procedure Note    Brad A Schoen  7/13/2018  [id number]    SURGEON  Larry Crowder IV, MD    ASSISTANT:  Chyna Richards CSA  INDICATION:  RADICULOPATHY WITH WEAKNESS    Pre-op Diagnosis:   Cervical radiculopathy [M54.12]    Post-Op Diagnosis Codes:     * Cervical radiculopathy [M54.12]    PROCEDURE PERFORMED:  Procedure(s):  CERVICAL DISCECTOMY ANTERIOR WITH ATLANTIS cervical 456, possible c5 corpectomy with anterior fixation from c4-6, use of allograft    Anesthesia: General    Staff:   Circulator: Isidra Gómez RN  Radiology Technologist: Ivelisse Wagoner, RRT  Scrub Person: Eryn Bautista  Vendor Representative: Justo Chowdhury  Assistant: Chyna Richards CSA    Estimated Blood Loss: 100ml    Specimens: * No orders in the log *      Drains:   Urethral Catheter Double-lumen 16 Fr. (Active)       Findings: LARGE sl DISC HERNAITIONS AT BOTH LEVELS    Complications: none immediate    Details:42-year-old male with 3-4 weeks of static right upper extremity weakness and severe pain.  The patient was imaged and noted to have large C4 5 and C5 6 disc herniations eccentric to the right.  Due to his weakness he was offered surgical intervention and elected this.  After risks benefits and alternatives are discussed the patient patient signed written consent.  Then escorted the operative suite intubated by anesthesia staff.  Jolly catheter was placed.  Neuro monitoring needles were placed.  Patient's neck was cleansed with alcohol use and prepped and draped in usual sterile manner.  Fluoroscopic unit was draped in the field and operative microscope draped in preparation for use.  Has very large shoulders and a short neck so C4 5 disc space was the lowest visualizable level on lateral fluoroscopic imaging.  Planned skin incision over the C5 vertebra was marked and infiltrated local anesthetic.  Timeout was performed.  Patient received preoperative  antibiotics.  Skin was incised the scalpel dissection was carried through the dermis and platysma.  Sharp dissection down the medial border sternomastoid afforded visualization of the patient's longus coli.  Tracheoesophageal bundle was swept medially the carotid under was swept laterally.  Position was obtained utilizing a bayoneted spinal needle placed in the C4 5 disc space, fluoroscopic confirmation was obtained.  Longus coli was elevated from C4-C5 and C6 and self-retaining retractors utilized to assist with visualization.  Distraction pins were placed at C4 and C6 and gentle distraction across the disc spaces was engaged.  Disc was incised  at C4 5 and C5 6.  MicroScope was draped and brought into the field.  The remainder of the case was performed utilizing microsurgical illumination and microsurgical technique with the help of the operative microscope.  Discectomy was performed on the level of posterior longitudinal ligament at the C4 5 level.  A rent in the posterior longitudinal ligament was appreciated and a large subligamentous disc herniation was noted to protrude from this.  Kerrison punches were utilized to perform resection of the posterior longitudinal ligament him osteophytes.  An bilateral foraminotomies were performed.  A small ball probe passed easily into the neural foramen at the conclusion of the decompression and additional disc material was removed from the right neural foramen.  Attention was then turned to the C5 6 level where this procedure was repeated.  Additional subligamentous disc herniation was noted at this level as well.  At each level lipping osteophytes were trimmed flush with the vertebral bodies.  7 x 14 x 11 L ASR allograft bone was then sized and tamped into place.  Distraction pins were discontinued in their sites waxed.  Demineralized bone matrix was placed anterior to the graft as there was quite a bit of space.  A 47.5 mm elite plate was selected and 40 by 15 screws  were placed at C4-C5 and C6 under fluoroscopic guidance.  The C4 screws were essentially the only level that could be directly visualized on lateral fluoroscopic imaging but AP images appeared adequate.  The cam locking mechanism was engaged on the plate at each level.  Copious irrigation was employed.  Longus coli edges were bipolar cautery until no ooze was noted.  Walls of the wound were inspected for hemostasis and once meticulous hemostasis was noted be present closure commenced utilizing absorbable Vicryl stitch approximate the platysma and a running Monocryl suture was utilized approximate the skin edges in a subcuticular manner.  Dermabond was places a final skin dressing.  The patient was placed in a hard collar due to the 2 level nature of the case.  Patient was transferred to the postanesthesia care unit in stable and satisfactory condition and needle sponge counts correct.  Surgical first assistant greatly reduced operative time and increase patient's safety by providing micro-suctioning under the operative microscope as well as assisting with placement of the hardware.  She also assisted with closure.      Larry Crowder IV, MD     Date: 7/13/2018  Time: 3:34 PM

## 2018-07-13 NOTE — H&P (VIEW-ONLY)
Subjective   Patient ID: Brad A Schoen is a 42 y.o. male who is being seen for consultation today at the request of Reggie Duane Lyell, MD for cervical stenosis. He had an MRI of the cervical spine at [facility] [place] on 6/20/18. He presents unaccompanied.    History of Present Illness 42 [employer] employee with 3 months of neck issues with some progression of right arm involvement and weakness x 1 month.  No loss of b/b.  He reports no numbness.  He has limited right deltoid function 3-4 weeks.  He also reports severe pain in the arm.  He feels his symptoms are static for the last month.  He has had 2 cher's withut efficacy.  He is taking some narcotics with limited efficacy as well.  He denies NSAID use.  He is not a tobacco user.  His pain is 9/10 today in the clinic.      The following portions of the patient's history were reviewed and updated as appropriate: allergies, current medications, past family history, past medical history, past social history, past surgical history and problem list.    Review of Systems   Constitutional: Negative for chills and fever.   Respiratory: Negative for cough and shortness of breath.    Cardiovascular: Negative for chest pain, palpitations and leg swelling.   Gastrointestinal: Negative for abdominal pain and constipation.   Genitourinary: Negative for difficulty urinating and enuresis. Penile pain: RUE.   Musculoskeletal: Positive for arthralgias (RUE) and neck pain. Negative for back pain and gait problem.   Skin: Negative for rash.   Neurological: Positive for weakness. Negative for numbness (or tingling).   Hematological: Does not bruise/bleed easily.   Psychiatric/Behavioral: Negative for sleep disturbance.       Objective   Physical Exam   Constitutional: He is oriented to person, place, and time.   Neurological: He is oriented to person, place, and time. Gait normal.   Reflex Scores:       Tricep reflexes are 2+ on the right side and 2+ on the left side.       Bicep reflexes are  1+ on the right side.       Brachioradialis reflexes are 2+ on the right side and 2+ on the left side.       Patellar reflexes are 2+ on the right side and 2+ on the left side.       Achilles reflexes are 2+ on the right side and 2+ on the left side.    Neurologic Exam     Mental Status   Oriented to person, place, and time.     Motor Exam   Muscle bulk: normal    Strength   Right deltoid: 3/5  Left deltoid: 5/5  Right biceps: 4/5  Left biceps: 5/5  Right triceps: 5/5  Left triceps: 5/5  Right wrist flexion: 5/5  Left wrist flexion: 5/5  Right interossei: 5/5  Left interossei: 5/5  Right iliopsoas: 5/5  Left iliopsoas: 5/5  Right quadriceps: 5/5  Left quadriceps: 5/5  Right hamstrin/5  Left hamstrin/5  Right anterior tibial: 5/5  Left anterior tibial: 5/5  Right gastroc: 5/5  Left gastroc: 5/55/5 EHL     Sensory Exam   Right arm light touch: normal  Left arm light touch: normal  Right leg light touch: normal  Left leg light touch: normal  Right arm pinprick: normal  Left arm pinprick: normal  Right leg pinprick: normal  Left leg pinprick: normal    Gait, Coordination, and Reflexes     Gait  Gait: normal    Reflexes   Right brachioradialis: 2+  Left brachioradialis: 2+  Right biceps: 1+  Right triceps: 2+  Left triceps: 2+  Right patellar: 2+  Left patellar: 2+  Right achilles: 2+  Left achilles: 2+  Right Anaya: absent  Left Anaya: absent  Right ankle clonus: absent  Left ankle clonus: absent         Assessment/Plan   Independent Review of Radiographic Studies:  c45 and c56 right sided disc /osteophytre complexes.  C56 is broad based.  No cord contusion is noted.  Xrays reveal     Medical Decision Making:  Patient has static and significant weakness and pain.  He needs surgery to improve his chance of recovery.  We discussed this will either be a corpectomy and fusion or 2 level ACDF dictated by the results of a CT scan.  We discussed red flags. He will require a hard collar post op.  We discussed  adjacent segment disease, dysphagia, csf leakage, infection, weakness, spinal cord injury, need for transfusion, swallowing issues or hoarseness.  We discussed perioperative restrictions and work issues in detail.  We will start some gabapentin to see if we can assist his pain while operative planning and finalization is occurring.  He is to report to the ER if his weakness worsens and understands this but as he has static deficits this is not emergent.      Chu was seen today for neck pain and arm pain.    Diagnoses and all orders for this visit:    Cervical radiculopathy  -     CT Cervical Spine Without Contrast; Future  -     Case Request; Standing  -     CBC and Differential; Future  -     Comprehensive metabolic panel; Future  -     APTT; Future  -     Protime-INR; Future  -     ceFAZolin (ANCEF) 3 g in sodium chloride 0.9 % 100 mL IVPB; Infuse 3 g into a venous catheter 1 (One) Time.  -     Case Request    Other orders  -     Follow anesthesia standing orders.  -     Obtain informed consent  -     Follow anesthesia standing orders.; Standing  -     Verify NPO Status; Standing  -     SCD (sequential compression device)- to be placed on patient in Pre-op; Standing  -     Provide NPO Instructions to Patient; Future      No Follow-up on file.

## 2018-07-13 NOTE — ANESTHESIA PREPROCEDURE EVALUATION
Anesthesia Evaluation     no history of anesthetic complications:               Airway   Mallampati: I  TM distance: >3 FB  Neck ROM: full  Dental - normal exam     Pulmonary    (+) a smoker Former,   (-) asthma, shortness of breath, recent URI, sleep apnea  Cardiovascular     (-) hypertension, dysrhythmias, angina, hyperlipidemia      Neuro/Psych  GI/Hepatic/Renal/Endo    (-) diabetes    Musculoskeletal     Abdominal    Substance History      OB/GYN          Other                        Anesthesia Plan    ASA 2     general     intravenous induction   Anesthetic plan and risks discussed with patient.

## 2018-07-13 NOTE — INTERVAL H&P NOTE
H&P reviewed. The patient was examined and there are no changes to the H&P.  Plan is to try as c456 ACDF from left approach.

## 2018-07-13 NOTE — PLAN OF CARE
Problem: Patient Care Overview  Goal: Plan of Care Review  Outcome: Ongoing (interventions implemented as appropriate)   07/13/18 1929   Coping/Psychosocial   Plan of Care Reviewed With patient   Plan of Care Review   Progress no change   OTHER   Outcome Summary From PACU 1810. Medicated for pain. Cont to monitor.       Problem: Pain, Acute (Adult)  Goal: Identify Related Risk Factors and Signs and Symptoms  Outcome: Outcome(s) achieved Date Met: 07/13/18    Goal: Acceptable Pain Control/Comfort Level  Outcome: Ongoing (interventions implemented as appropriate)      Problem: Fall Risk (Adult)  Goal: Identify Related Risk Factors and Signs and Symptoms  Outcome: Outcome(s) achieved Date Met: 07/13/18    Goal: Absence of Fall  Outcome: Ongoing (interventions implemented as appropriate)

## 2018-07-13 NOTE — ANESTHESIA POSTPROCEDURE EVALUATION
"Patient: Brad A Schoen    Procedure Summary     Date:  07/13/18 Room / Location:  [facility] OR  / [facility] MAIN OR    Anesthesia Start:  1239 Anesthesia Stop:  1608    Procedure:  CERVICAL DISCECTOMY ANTERIOR WITH ATLANTIS cervical 456, possible c5 corpectomy with anterior fixation from c4-6, use of allograft (Left Spine Cervical) Diagnosis:       Cervical radiculopathy      (Cervical radiculopathy [M54.12])    Surgeon:  Larry Crowder IV, MD Provider:  Saurabh Zuleta MD    Anesthesia Type:  general ASA Status:  2          Anesthesia Type: general  Last vitals  BP   105/76 (07/13/18 1740)   Temp   36.8 °C (98.3 °F) (07/13/18 1740)   Pulse   72 (07/13/18 1740)   Resp   20 (07/13/18 1740)     SpO2   95 % (07/13/18 1740)     Post Anesthesia Care and Evaluation    Patient location during evaluation: bedside  Patient participation: complete - patient participated  Level of consciousness: awake  Pain management: adequate  Airway patency: patent  Anesthetic complications: No anesthetic complications    Cardiovascular status: acceptable  Respiratory status: acceptable  Hydration status: acceptable    Comments: /76   Pulse 72   Temp 36.8 °C (98.3 °F) (Oral)   Resp 20   Ht 177.8 cm (70\")   Wt 126 kg (277 lb 9 oz)   SpO2 95%   BMI 39.83 kg/m²         "

## 2018-07-14 PROCEDURE — 25010000002 CEFAZOLIN PER 500 MG: Performed by: NEUROLOGICAL SURGERY

## 2018-07-14 PROCEDURE — 99024 POSTOP FOLLOW-UP VISIT: CPT | Performed by: NEUROLOGICAL SURGERY

## 2018-07-14 PROCEDURE — 25010000002 MORPHINE PER 10 MG: Performed by: NEUROLOGICAL SURGERY

## 2018-07-14 RX ORDER — ZOLPIDEM TARTRATE 5 MG/1
10 TABLET ORAL NIGHTLY PRN
Status: DISCONTINUED | OUTPATIENT
Start: 2018-07-14 | End: 2018-07-15 | Stop reason: HOSPADM

## 2018-07-14 RX ORDER — HYDROCODONE BITARTRATE AND ACETAMINOPHEN 7.5; 325 MG/1; MG/1
2 TABLET ORAL EVERY 6 HOURS PRN
Status: DISCONTINUED | OUTPATIENT
Start: 2018-07-14 | End: 2018-07-15 | Stop reason: HOSPADM

## 2018-07-14 RX ADMIN — CEFAZOLIN SODIUM 3 G: 10 INJECTION, POWDER, FOR SOLUTION INTRAVENOUS at 04:03

## 2018-07-14 RX ADMIN — MORPHINE SULFATE 2 MG: 2 INJECTION, SOLUTION INTRAMUSCULAR; INTRAVENOUS at 19:52

## 2018-07-14 RX ADMIN — MORPHINE SULFATE 2 MG: 2 INJECTION, SOLUTION INTRAMUSCULAR; INTRAVENOUS at 22:26

## 2018-07-14 RX ADMIN — MORPHINE SULFATE 2 MG: 2 INJECTION, SOLUTION INTRAMUSCULAR; INTRAVENOUS at 00:08

## 2018-07-14 RX ADMIN — GABAPENTIN 300 MG: 300 CAPSULE ORAL at 19:52

## 2018-07-14 RX ADMIN — HYDROCODONE BITARTRATE AND ACETAMINOPHEN 1 TABLET: 7.5; 325 TABLET ORAL at 04:03

## 2018-07-14 RX ADMIN — MORPHINE SULFATE 2 MG: 2 INJECTION, SOLUTION INTRAMUSCULAR; INTRAVENOUS at 11:36

## 2018-07-14 RX ADMIN — GABAPENTIN 300 MG: 300 CAPSULE ORAL at 16:12

## 2018-07-14 RX ADMIN — MORPHINE SULFATE 2 MG: 2 INJECTION, SOLUTION INTRAMUSCULAR; INTRAVENOUS at 17:49

## 2018-07-14 RX ADMIN — HYDROCODONE BITARTRATE AND ACETAMINOPHEN 2 TABLET: 7.5; 325 TABLET ORAL at 21:58

## 2018-07-14 RX ADMIN — MORPHINE SULFATE 2 MG: 2 INJECTION, SOLUTION INTRAMUSCULAR; INTRAVENOUS at 06:14

## 2018-07-14 RX ADMIN — MORPHINE SULFATE 2 MG: 2 INJECTION, SOLUTION INTRAMUSCULAR; INTRAVENOUS at 08:23

## 2018-07-14 RX ADMIN — MORPHINE SULFATE 2 MG: 2 INJECTION, SOLUTION INTRAMUSCULAR; INTRAVENOUS at 02:16

## 2018-07-14 RX ADMIN — ZOLPIDEM TARTRATE 10 MG: 5 TABLET ORAL at 22:26

## 2018-07-14 RX ADMIN — HYDROCODONE BITARTRATE AND ACETAMINOPHEN 2 TABLET: 7.5; 325 TABLET ORAL at 09:38

## 2018-07-14 RX ADMIN — GABAPENTIN 300 MG: 300 CAPSULE ORAL at 08:23

## 2018-07-14 RX ADMIN — MORPHINE SULFATE 2 MG: 2 INJECTION, SOLUTION INTRAMUSCULAR; INTRAVENOUS at 14:09

## 2018-07-14 RX ADMIN — HYDROCODONE BITARTRATE AND ACETAMINOPHEN 2 TABLET: 7.5; 325 TABLET ORAL at 16:13

## 2018-07-14 NOTE — PLAN OF CARE
Problem: Patient Care Overview  Goal: Plan of Care Review  Outcome: Ongoing (interventions implemented as appropriate)   07/14/18 0116   Coping/Psychosocial   Plan of Care Reviewed With patient   Plan of Care Review   Progress improving   OTHER   Outcome Summary Pt is a post op of a C4-C6 Diskectomy with fusion and instrumentation. Dressing is clean dry and intact. Pt continues with the hard cervical collar in place. Pt was having some increased pain when he arrived to the floor. Called the on call Dr and got the IV Morphine increased to Q2hrs. Pt also continues with PO pain meds Q4hrs and Robaxin Q6hrs. Pt pain has been a little better. Pt continues with the prakash to bedside drainage that will be removed in AM. Pt educated on importance of proper handwashing to prevent any infection to the surgical site. Pt voiced understanding. Pt is resting at this time, will continue to monitor.     Goal: Individualization and Mutuality  Outcome: Ongoing (interventions implemented as appropriate)    Goal: Discharge Needs Assessment  Outcome: Ongoing (interventions implemented as appropriate)    Goal: Interprofessional Rounds/Family Conf  Outcome: Ongoing (interventions implemented as appropriate)      Problem: Pain, Acute (Adult)  Goal: Acceptable Pain Control/Comfort Level  Outcome: Ongoing (interventions implemented as appropriate)      Problem: Fall Risk (Adult)  Goal: Absence of Fall  Outcome: Ongoing (interventions implemented as appropriate)

## 2018-07-14 NOTE — PROGRESS NOTES
"Postoperative day #1 status post 2 level anterior cervical discectomy.    Subjective: His a lot of pain.  He notes that the pain medicine isn't working.  He is not been out of bed.  Pain is between shoulder blades.  He feels as if the right arm pain is improved though he's had some tingling in the left arm overnight.    Objective: Wound is clean clear and dry and the neck is soft.  He is swallowing well.  His voice sounds normal.    Motor strength is 5 out of 5 in the upper and lower extremities.  He is in the hard collar.    /86 (BP Location: Left arm, Patient Position: Lying)   Pulse 73   Temp 97.8 °F (36.6 °C) (Oral)   Resp 18   Ht 177.8 cm (70\")   Wt 126 kg (277 lb 9 oz)   SpO2 97%   BMI 39.83 kg/m²       Assessment: Postoperative discomfort.    Plan: Increase activity, review medications.  Hopefully home tomorrow.  "

## 2018-07-15 VITALS
OXYGEN SATURATION: 97 % | HEART RATE: 92 BPM | HEIGHT: 70 IN | WEIGHT: 277.56 LBS | TEMPERATURE: 98.5 F | DIASTOLIC BLOOD PRESSURE: 91 MMHG | RESPIRATION RATE: 18 BRPM | SYSTOLIC BLOOD PRESSURE: 145 MMHG | BODY MASS INDEX: 39.74 KG/M2

## 2018-07-15 PROCEDURE — 25010000002 MORPHINE PER 10 MG: Performed by: NEUROLOGICAL SURGERY

## 2018-07-15 RX ORDER — DEXAMETHASONE 2 MG/1
4 TABLET ORAL 2 TIMES DAILY WITH MEALS
Qty: 10 TABLET | Refills: 0 | Status: SHIPPED | OUTPATIENT
Start: 2018-07-15 | End: 2018-07-26

## 2018-07-15 RX ORDER — HYDROCODONE BITARTRATE AND ACETAMINOPHEN 10; 325 MG/1; MG/1
1 TABLET ORAL EVERY 4 HOURS PRN
Qty: 50 TABLET | Refills: 0 | Status: SHIPPED | OUTPATIENT
Start: 2018-07-15 | End: 2018-08-27

## 2018-07-15 RX ADMIN — MORPHINE SULFATE 2 MG: 2 INJECTION, SOLUTION INTRAMUSCULAR; INTRAVENOUS at 04:47

## 2018-07-15 RX ADMIN — MORPHINE SULFATE 2 MG: 2 INJECTION, SOLUTION INTRAMUSCULAR; INTRAVENOUS at 01:26

## 2018-07-15 RX ADMIN — HYDROCODONE BITARTRATE AND ACETAMINOPHEN 2 TABLET: 7.5; 325 TABLET ORAL at 09:43

## 2018-07-15 RX ADMIN — HYDROCODONE BITARTRATE AND ACETAMINOPHEN 2 TABLET: 7.5; 325 TABLET ORAL at 03:41

## 2018-07-15 RX ADMIN — GABAPENTIN 300 MG: 300 CAPSULE ORAL at 08:06

## 2018-07-15 RX ADMIN — MORPHINE SULFATE 2 MG: 2 INJECTION, SOLUTION INTRAMUSCULAR; INTRAVENOUS at 09:43

## 2018-07-15 RX ADMIN — MORPHINE SULFATE 2 MG: 2 INJECTION, SOLUTION INTRAMUSCULAR; INTRAVENOUS at 06:37

## 2018-07-15 NOTE — PLAN OF CARE
Problem: Patient Care Overview  Goal: Plan of Care Review  Outcome: Ongoing (interventions implemented as appropriate)   07/15/18 0359   Coping/Psychosocial   Plan of Care Reviewed With patient   Plan of Care Review   Progress improving   OTHER   Outcome Summary pt is alert and oriented, room air, hard collar in place, complaining of neck pain, medicated PRN, no falls, ambulated in the renteria, possible D/C, continue to monitor     Goal: Individualization and Mutuality  Outcome: Ongoing (interventions implemented as appropriate)    Goal: Discharge Needs Assessment  Outcome: Ongoing (interventions implemented as appropriate)      Problem: Pain, Acute (Adult)  Goal: Acceptable Pain Control/Comfort Level  Outcome: Ongoing (interventions implemented as appropriate)      Problem: Fall Risk (Adult)  Goal: Absence of Fall  Outcome: Ongoing (interventions implemented as appropriate)

## 2018-07-15 NOTE — PLAN OF CARE
Problem: Patient Care Overview  Goal: Plan of Care Review   07/15/18 1120   Coping/Psychosocial   Plan of Care Reviewed With patient   Plan of Care Review   Progress improving   OTHER   Outcome Summary Pain meds given. Pt ambulated around unit. Discharge orderd. Waiting for father transportation

## 2018-07-15 NOTE — DISCHARGE SUMMARY
Brad A Schoen  1975    Patient Care Team:  Reggie Duane Lyell, MD as PCP - General (Family Medicine)  Doris Ramos MD (Orthopedic Surgery)  Jesus Sr MD as Consulting Physician (Family Medicine)    Date of Admit: 7/13/2018    Date of Discharge:  7/15/2018    Discharge Diagnosis:Principal Problem:    Cervical radiculopathy      Procedures Performed  Procedure(s):  CERVICAL DISCECTOMY ANTERIOR WITH ATLANTIS cervical 456, possible c5 corpectomy with anterior fixation from c4-6, use of allograft       Consultants:   Consults     No orders found from 6/14/2018 to 7/14/2018.          Condition on Discharge: stable    Discharge disposition: home    Pertinent Test Results: Post Op cervical X rays show excellent instrumenation placement   radiculopathy.  Brief HPI: The patient presented with cervical radiculopathy.  He had degenerative arthritic changes and disc herniations at 2 levels in the cervical spine.    Hospital Course: The patient underwent the above-noted procedure.  Postoperatively his right upper extremity pain had resolved however he was having left shoulder discomfort.  He remained an additional night because of this.  He notes that pain medication doesn't seem to be helping.  Patellar discharge however he was ambulatory, his pain is controlled with oral pain medication, and there are no obvious motor deficits.    I decided to try a short course of steroid medication.  He was prescribed a slightly stronger narcotic pain medication as well and discharged and he was taking on admission.    Discharge Physical Exam:    General Appearance No acute distress   Neck No adenopathy, supple, trachea midline, no thyromegaly, no carotid bruit, no JVD   Lungs Clear to auscultation,respirations regular, even and unlabored   Heart Regular rhythm and normal rate, normal S1 and S2, no murmur, no gallop, no rub, no click   Chest wall No abnormalities observed   Abdomen Normal bowel sounds, no masses, no  hepatomegaly, soft   Extremities Moves all extremities well, no edema, no cyanosis, no redness   Neurological Alert and oriented x 3     Discharge Medications     Your medication list      START taking these medications      Instructions Last Dose Given Next Dose Due   dexamethasone 2 MG tablet  Commonly known as:  DECADRON      Take 2 tablets by mouth 2 (Two) Times a Day With Meals.       HYDROcodone-acetaminophen  MG per tablet  Commonly known as:  NORCO  Replaces:  HYDROcodone-acetaminophen 7.5-325 MG per tablet      Take 1 tablet by mouth Every 4 (Four) Hours As Needed for Moderate Pain  (Pain).          STOP taking these medications    HYDROcodone-acetaminophen 7.5-325 MG per tablet  Commonly known as:  NORCO  Replaced by:  HYDROcodone-acetaminophen  MG per tablet           ASK your doctor about these medications      Instructions Last Dose Given Next Dose Due   gabapentin 300 MG capsule  Commonly known as:  NEURONTIN      Take 300 mg by mouth As Needed.             Where to Get Your Medications      These medications were sent to [facility] [address] [place] [address] [place] - [address] [facility] AT [facility] - [phone] [place] [phone]   [address] [facility] [place] [address]    Phone:  [phone]   · dexamethasone 2 MG tablet     You can get these medications from any pharmacy    Bring a paper prescription for each of these medications  · HYDROcodone-acetaminophen  MG per tablet         Discharge Diet:   Diet Instructions     Diet:       Diet Texture / Consistency:  Soft Texture    Select Texture:  Ground          Diet Order   Procedures   • Diet Clear Liquid       Activity at Discharge:  gradually increase activity.  No lifting overhead, no lifting more than 8 pounds, Tahira shower but do not scrub the wound.To wear his hard collar at all times but can have it off for showering.      Call for: Patient instructed to call for fever >100.5, chills, wound  swelling/drainage/redness, change in neurologic status including but not limited to wound issues    Follow-up Appointments  Future Appointments  Date Time Provider Department Center   7/26/2018 2:00 PM AGUILAR Moore [facility] None     Additional Instructions for the Follow-ups that You Need to Schedule     Discharge Follow-up with Specified Provider: Vel moore    As directed      To:  Vel moore    Follow Up Details:  as arranged               Test Results Pending at Discharge: None       Carlin Smith MD  07/15/18  9:24 AM    30 min spent in reviewing records, discussion and examination of the patient and discussion with other members of the patient's medical team.

## 2018-07-16 NOTE — PROGRESS NOTES
Case Management Discharge Note    Final Note: home    Destination     No service has been selected for the patient.      Durable Medical Equipment     No service has been selected for the patient.      Dialysis/Infusion     No service has been selected for the patient.      Home Medical Care     No service has been selected for the patient.      Social Care     No service has been selected for the patient.        Other:  (car)    Final Discharge Disposition Code: 01 - home or self-care

## 2018-07-26 ENCOUNTER — HOSPITAL ENCOUNTER (OUTPATIENT)
Dept: GENERAL RADIOLOGY | Facility: HOSPITAL | Age: 43
Discharge: HOME | End: 2018-07-26
Admitting: NURSE PRACTITIONER

## 2018-07-26 ENCOUNTER — OFFICE VISIT (OUTPATIENT)
Dept: NEUROSURGERY | Facility: CLINIC | Age: 43
End: 2018-07-26

## 2018-07-26 VITALS
HEIGHT: 70 IN | SYSTOLIC BLOOD PRESSURE: 130 MMHG | BODY MASS INDEX: 39.22 KG/M2 | WEIGHT: 274 LBS | DIASTOLIC BLOOD PRESSURE: 88 MMHG | RESPIRATION RATE: 16 BRPM | HEART RATE: 120 BPM

## 2018-07-26 DIAGNOSIS — Z98.890 POSTOPERATIVE STATE: ICD-10-CM

## 2018-07-26 DIAGNOSIS — M54.12 CERVICAL RADICULOPATHY: Primary | ICD-10-CM

## 2018-07-26 DIAGNOSIS — R29.898 RIGHT ARM WEAKNESS: ICD-10-CM

## 2018-07-26 DIAGNOSIS — Z98.890 POST-OPERATIVE STATE: ICD-10-CM

## 2018-07-26 DIAGNOSIS — Z98.890 POST-OPERATIVE STATE: Primary | ICD-10-CM

## 2018-07-26 DIAGNOSIS — M47.12 CERVICAL SPONDYLOSIS WITH MYELOPATHY: ICD-10-CM

## 2018-07-26 PROCEDURE — 99024 POSTOP FOLLOW-UP VISIT: CPT | Performed by: NURSE PRACTITIONER

## 2018-07-26 PROCEDURE — 72040 X-RAY EXAM NECK SPINE 2-3 VW: CPT

## 2018-07-26 NOTE — PROGRESS NOTES
" HPI:   Brad A Schoen is a 42 y.o. male for follow-up status post C4 5 and C5 6 ACDF and C5 corpectomy due to history of large cervical disc herniations.  Preop, the patient also had right upper extremity weakness with severe pain.  He was placed in a cervical hard collar postop.  He follows up today for his first postop visit.    Today, he reports that the right arm weakness is improving though it is still very weak.  He is now able to raise the right arm without having to use the left hand to do so.  He continues to try to move the arm in all directions frequently to increase strength.  The pain has completely resolved postop that was radiating down into the hand and fingers.  He denies any numbness and tingling.  He also denies any neck pain with the exception of some minimal muscular discomfort on the back left side underneath the hard collar.  He denies any new problems or issues.  He is hopeful that the right arm weakness will continue to improve.  He was taking hydrocodone at night to help him sleep.  He also continues taking the gabapentin.  He has been compliant wearing the cervical hard collar at all times.    He presents unaccompanied.      /88 (BP Location: Left arm, Patient Position: Sitting)   Pulse 120   Resp 16   Ht 177.8 cm (70\")   Wt 124 kg (274 lb)   BMI 39.31 kg/m²       Review of Systems   Constitutional: Negative for chills and fever.   HENT: Negative for trouble swallowing.    Gastrointestinal: Negative for constipation.   Genitourinary: Negative for difficulty urinating and enuresis.   Musculoskeletal: Positive for neck pain (occ'l) and neck stiffness (patient in brace).        Denies arm pain   Skin: Negative for wound (wound redness, swelling, or drainage).   Neurological: Positive for weakness (RUE) and numbness (RUE). Negative for headaches.   Psychiatric/Behavioral: Positive for sleep disturbance.        /88 (BP Location: Left arm, Patient Position: Sitting)   Pulse 120 " "  Resp 16   Ht 177.8 cm (70\")   Wt 124 kg (274 lb)   BMI 39.31 kg/m²     Physical Exam   Constitutional: He is oriented to person, place, and time. He appears well-developed and well-nourished. He is cooperative.   Pleasant, overweight, well-appearing middle-aged male   HENT:   Head: Normocephalic and atraumatic.   Neck: Neck supple. No tracheal deviation present.   Wearing a well fitting cervical hard collar   Pulmonary/Chest: Effort normal.   Musculoskeletal: Normal range of motion. He exhibits tenderness (Bilateral upper back and shoulder areas underneath the hard collar).   Moving all extremities well  Decreased strength right upper extremity 3+/5 bicep and tricep, 4+/5 right deltoid   Neurological: He is alert and oriented to person, place, and time. He displays no tremor and normal reflexes. No cranial nerve deficit. He exhibits normal muscle tone. Coordination and gait normal. GCS eye subscore is 4. GCS verbal subscore is 5. GCS motor subscore is 6.   Reflex Scores:       Tricep reflexes are 2+ on the right side and 2+ on the left side.       Bicep reflexes are 2+ on the right side and 2+ on the left side.       Brachioradialis reflexes are 2+ on the right side and 2+ on the left side.  Gait is stable and upright     Skin: Skin is warm and dry.   Psychiatric: He has a normal mood and affect. His behavior is normal. Thought content normal.   Vitals reviewed.    Neurologic Exam     Mental Status   Oriented to person, place, and time.     Gait, Coordination, and Reflexes     Reflexes   Right brachioradialis: 2+  Left brachioradialis: 2+  Right biceps: 2+  Left biceps: 2+  Right triceps: 2+  Left triceps: 2+      Findings/Results:  Cervical x-rays reveal intact surgical hardware as expected, no new abnormalities noted.      Assessment/Plan:  Chu was seen today for post-op.    Diagnoses and all orders for this visit:    Cervical radiculopathy  -     XR Spine Cervical 2 View; Future    Cervical spondylosis " with myelopathy    Right arm weakness    Postoperative state  -     XR Spine Cervical 2 View; Future        Discussion/Summary  He returns to the office today for first postop visit status post cervical decompression and fusion with corpectomy by Dr. Crowder  13.  Exam as noted above, no red flags.  He is wearing a well fitting cervical hard collar that was slightly readjusted to ensure the most appropriate fit during today's office visit.  I also instructed him on how to loosen and tightness.  He continues to have pretty significant right upper extremity weakness though this is improving.  The pain in the right arm is gone.  He denies any new problems.  He does have some expected muscular discomfort in the back of his neck below the collar.    He has been taking hydrocodone at night to help him sleep.  I explained that this is not the best medication to aid with sleeping.  He is to try the muscle relaxers instead as this will likely help relax some of the cervical musculature.  He can also try over-the-counter sleep medications including Benadryl and melatonin.  I do not give him a refill on the hydrocodone.  He currently denies any pain.  He is doing and feeling well.  He is okay to lift upwards of 10-15 pounds but should not lift anything more than this for the duration of wearing the hard collar.  Cervical x-rays reveal intact surgical hardware with no new abnormalities.  We will have her return the office in one month to see Dr. Crowder with additional x-rays at that time in consideration of weaning out of the hard collar.    He is not to drive while wearing the hard collar.  He expressed understanding.  All other postoperative instructions were discussed at length.  I also encouraged daily walking.    Plan: Return to office in one month to see Dr. Crowder with new x-rays  Plan: Return in about 4 weeks (around 8/23/2018).         Patient Care Team    Patient Care Team:  Reggie Duane Lyell, MD as PCP - General (Family  Medicine)    Torie Che, APRN  7/26/2018    Dragon disclaimer:   Much of this encounter note is an electronic transcription/translation of spoken language to printed text. The electronic translation of spoken language may permit erroneous, or at times, nonsensical words or phrases to be inadvertently transcribed; Although I have reviewed the note for such errors, some may still exist.

## 2018-07-31 ENCOUNTER — TELEPHONE (OUTPATIENT)
Dept: NEUROSURGERY | Facility: CLINIC | Age: 43
End: 2018-07-31

## 2018-07-31 NOTE — TELEPHONE ENCOUNTER
Patient was seen by AGUILAR Marroquin status post an ACDF on 7/13/18 by Dr. Larry Crowder. He was sent for xrays same day. He says that he was supposed to get the results prior to next office visit.

## 2018-07-31 NOTE — TELEPHONE ENCOUNTER
I told him we would call if there were any issues.  I also told him he could stop by following the x-rays and I would be happy to go over them, he declined.  You can let him know that the x-rays are stable showing intact surgical hardware and expected findings. thx!

## 2018-07-31 NOTE — TELEPHONE ENCOUNTER
Patient had surgery on 7/13 by Dr Crowder for ACDF. Pt was last seen on 7/26 by Torie and was sent for  X rays after his appointment.  Pt has been waiting to find out the results of his x rays please.   Pt has to do x rays before his next appt with Dr Crowder on 8/27        [facility] [phone]

## 2018-08-27 ENCOUNTER — OFFICE VISIT (OUTPATIENT)
Dept: NEUROSURGERY | Facility: CLINIC | Age: 43
End: 2018-08-27

## 2018-08-27 ENCOUNTER — HOSPITAL ENCOUNTER (OUTPATIENT)
Dept: GENERAL RADIOLOGY | Facility: HOSPITAL | Age: 43
Discharge: HOME | End: 2018-08-27
Admitting: NURSE PRACTITIONER

## 2018-08-27 VITALS
HEIGHT: 70 IN | WEIGHT: 280 LBS | BODY MASS INDEX: 40.09 KG/M2 | SYSTOLIC BLOOD PRESSURE: 146 MMHG | DIASTOLIC BLOOD PRESSURE: 100 MMHG

## 2018-08-27 DIAGNOSIS — M47.12 CERVICAL SPONDYLOSIS WITH MYELOPATHY: ICD-10-CM

## 2018-08-27 DIAGNOSIS — M54.12 CERVICAL RADICULOPATHY: ICD-10-CM

## 2018-08-27 DIAGNOSIS — R29.898 RIGHT ARM WEAKNESS: Primary | ICD-10-CM

## 2018-08-27 DIAGNOSIS — Z98.890 POSTOPERATIVE STATE: ICD-10-CM

## 2018-08-27 PROCEDURE — 99024 POSTOP FOLLOW-UP VISIT: CPT | Performed by: NEUROLOGICAL SURGERY

## 2018-08-27 PROCEDURE — 72040 X-RAY EXAM NECK SPINE 2-3 VW: CPT

## 2018-08-27 RX ORDER — HYDROCODONE BITARTRATE AND ACETAMINOPHEN 5; 325 MG/1; MG/1
1 TABLET ORAL EVERY 4 HOURS PRN
Qty: 20 TABLET | Refills: 0 | Status: SHIPPED | OUTPATIENT
Start: 2018-08-27 | End: 2018-09-06

## 2018-08-27 NOTE — PROGRESS NOTES
Subjective   Patient ID: Brad A Schoen is a 43 y.o. male who is here today for follow-up for neck pain. He is status post an ACDF on 18. She had cervical xrays today at [facility]. He presents unaccompanied.    History of Present Illness  Patient is doing well.  Swallow is OK.  Arm is improved in term of pain in arm and tingling.  He has point tenderness in his right shoulder.  No swallowing issues or fever.     The following portions of the patient's history were reviewed and updated as appropriate: allergies, current medications, past family history, past medical history, past social history, past surgical history and problem list.    Review of Systems   Musculoskeletal: Negative for arthralgias and neck pain.   Neurological: Positive for weakness (RUE ). Negative for numbness.       Objective   Physical Exam  Neurologic Exam     Strength   Right deltoid: 4/5  Left deltoid: 5/5  Right biceps: 5/5  Left biceps: 5/5  Right triceps: 5/5  Left triceps: 5/5  Right wrist flexion: 5/5  Left wrist flexion: 5/5  Right interossei: 5/5  Left interossei: 5/5  Right iliopsoas: 5/5  Left iliopsoas: 5/5  Right quadriceps: 5/5  Left quadriceps: 5/5  Right hamstrin/5  Left hamstrin/5  Right anterior tibial: 5/5  Left anterior tibial: 5/5  Right gastroc: 5/5  Left gastroc: 5/55/5 EHL      Sensory Exam   Right arm light touch: normal  Left arm light touch: normal  Right leg light touch: normal  Left leg light touch: normal  Right arm pinprick: normal  Left arm pinprick: normal  Right leg pinprick: normal  Left leg pinprick: normal     Gait, Coordination, and Reflexes      Gait  Gait: normal     Reflexes   Right brachioradialis: 2+  Left brachioradialis: 2+  Right biceps: 1+  Right triceps: 2+  Left triceps: 2+  Right patellar: 2+  Left patellar: 2+  Right achilles: 2+  Left achilles: 2+  Right Anaya: absent  Left Anaya: absent  Right ankle clonus: absent  Left ankle clonus: absent      C/d/i  Assessment/Plan    Independent Review of Radiographic Studies:    Stable construct  Medical Decision Making:  He has made some good gains.  He does have point tenderness in his right shoulder still.  His delt and biceps strength is improved and arm parasthesia is resolved.  I am concrned he may have a rotator cuff issue as well and will refer him to a trusted colleague for evaluation.  He had symptoms for 5 weeks of severe pain preop and is improved significantly.  We removed his collar today.    There are no diagnoses linked to this encounter.  No Follow-up on file.

## 2018-08-29 ENCOUNTER — TELEPHONE (OUTPATIENT)
Dept: NEUROSURGERY | Facility: CLINIC | Age: 43
End: 2018-08-29

## 2018-08-29 NOTE — TELEPHONE ENCOUNTER
Patient had surgery on 7/13 by Dr Crowder for cervical rad. Pt was last seen on 8/27.       Astrid with dental office [phone] called and said that the patient is there at this time. He just informed them that he had surgery on 7/13 by Dr Crowder for cervical discectomy. Pt told them that he has screws in his neck. They want to know if he needs to pre medicated before they do any work on him.  Please advise

## 2018-08-29 NOTE — TELEPHONE ENCOUNTER
Antibiotics are no longer required by our office for dental work following surgery.  Ultimately, this is at the discretion of the dentist.

## 2018-08-29 NOTE — TELEPHONE ENCOUNTER
Patient is status post a cervical discectomy on 7/13/18 by Dr. Larry Crowder.     Astrid at the dentist office states patient is only having a cleaning today. She wants to know if the patient will require premedication for any dental procedures in the future. She wants to know if so, how long does the patient need to be premedicated and what medication. She would like to know if this protocol needs to be followed for life or what period post op.

## 2018-09-24 ENCOUNTER — OFFICE VISIT (OUTPATIENT)
Dept: ORTHOPEDIC SURGERY | Facility: CLINIC | Age: 43
End: 2018-09-24

## 2018-09-24 VITALS — TEMPERATURE: 98.2 F | BODY MASS INDEX: 40.46 KG/M2 | WEIGHT: 282.6 LBS | HEIGHT: 70 IN

## 2018-09-24 DIAGNOSIS — G89.29 CHRONIC RIGHT SHOULDER PAIN: Primary | ICD-10-CM

## 2018-09-24 DIAGNOSIS — M25.511 CHRONIC RIGHT SHOULDER PAIN: Primary | ICD-10-CM

## 2018-09-24 PROCEDURE — 73030 X-RAY EXAM OF SHOULDER: CPT | Performed by: ORTHOPAEDIC SURGERY

## 2018-09-24 PROCEDURE — 99203 OFFICE O/P NEW LOW 30 MIN: CPT | Performed by: ORTHOPAEDIC SURGERY

## 2018-09-24 NOTE — PROGRESS NOTES
"    Patient: Brad A Schoen    YOB: 1975    Medical Record Number: [id number]    Chief Complaints:  Right arm weakness    History of Present Illness:     43 y.o. male patient referred by Dr. Crowder for evaluation of his right arm.  He reports that he developed a \"pulled neck muscle\" around the beginning of June.  He tells me that he was having neck pain and shooting arm pain but no weakness at that time.  This quickly progressed to weakness of the right arm.  He underwent an ACDF in July.  His pain is now nearly completely resolved.  He does get some occasional mild discomfort when sleeping on his right side, pushing on the front of his shoulder and brushing his teeth.  His biggest complaint is persistent weakness in the arm and shoulder.  He denies any progressive neurologic deficit.  He also denies any numbness, paresthesias or other associated symptoms.    Allergies: No Known Allergies    Home Medications:      Current Outpatient Prescriptions:   •  gabapentin (NEURONTIN) 300 MG capsule, Take 300 mg by mouth 3 (Three) Times a Day., Disp: , Rfl:     Past Medical History:   Diagnosis Date   • Cervical disc disorder    • Indigestion     occ       Past Surgical History:   Procedure Laterality Date   • ANTERIOR CERVICAL DISCECTOMY W/ FUSION Left 7/13/2018    Procedure: CERVICAL DISCECTOMY ANTERIOR WITH ATLANTIS cervical 456, possible c5 corpectomy with anterior fixation from c4-6, use of allograft;  Surgeon: Larry Crowder IV, MD;  Location: [facility];  Service: Neurosurgery   • BACK SURGERY       Dr. Keller 2010 & Dr. Corral 2014   • KNEE SURGERY Right 2013   • LAPAROSCOPIC CHOLECYSTECTOMY     • NECK SURGERY  07/13/2018       Social History     Occupational History   •       Full Time (laid off currently)      Social History Main Topics   • Smoking status: Former Smoker     Quit date: 2015   • Smokeless tobacco: Never Used   • Alcohol use Yes      Comment: occ   • Drug use: No   • Sexual " "activity: Defer      Social History     Social History Narrative   • No narrative on file       Family History   Problem Relation Age of Onset   • No Known Problems Mother    • Malig Hyperthermia Neg Hx      Review of Systems:      Constitutional: Denies fever, shaking or chills   Eyes: Denies change in visual acuity   HEENT: Denies nasal congestion or sore throat   Respiratory: Denies cough or shortness of breath   Cardiovascular: Denies chest pain or edema  Endocrine: Denies tremors, palpitations, intolerance of heat or cold, polyuria, polydipsia.  GI: Denies abdominal pain, nausea, vomiting, bloody stools or diarrhea  : Denies frequency, urgency, incontinence, retention, or nocturia.  Musculoskeletal: Denies numbness, tingling or loss of motor function except as above  Integument: Denies rash, lesion or ulceration   Neurologic: Denies headache or focal weakness, deficits  Heme: Denies spontaneous or excessive bleeding, epistaxis, hematuria, melena, fatigue, enlarged or tender lymph nodes.      All other pertinent positives and negatives as noted above in HPI.    Physical Exam: 43 y.o. male    Vitals:    09/24/18 0828   Temp: 98.2 °F (36.8 °C)   Weight: 128 kg (282 lb 9.6 oz)   Height: 177.8 cm (70\")     General:  Patient is awake and alert.  Appears in no acute distress or discomfort.    Psych:  Affect and demeanor are appropriate.    Eyes:  Conjunctiva and sclera appear grossly normal.  Eyes track well and EOM seem to be intact.    Ears:  No gross abnormalities.  Hearing adequate for the exam.    Cardiovascular:  Regular rate and rhythm.    Lungs:  Good chest expansion.  Breathing unlabored.    Lymph:  No palpable masses or adenopathy in the right upper extremity    Extremities:  The right shoulder is examined.  He does have some subtle atrophy of the posterior deltoid and infraspinatus.  No swelling or masses.  No palpable adenopathy.  He has some very mild tenderness anteriorly over the biceps but nothing " exquisite.  No effusion.  He has near full shoulder motion.  He struggles at extremes of elevation and abduction.  He has weakness with resisted shoulder abduction, forward elevation and external rotation.  He also has weakness with elbow flexion and extension as well as forearm supination.  He has good strength with wrist flexion and extension, , pinch, finger and thumb abduction.  He has normal sensation throughout the arm and hand.  Palpable radial pulse.  Brisk capillary refill.         Radiology:   AP, scapular Y, and axillary views of the right shoulder are ordered by myself and reviewed to evaluate the patient's complaint.  No comparison films are immediately available.  The x-rays show no obvious acute abnormalities, lesions, masses, significant degenerative changes, or other concerning findings.  The acromiohumeral interval is normal.  Glenoid version appears normal as well.    Assessment/Plan:  Right arm weakness    I do not think that he has any significant intrinsic shoulder pathology contributing to his weakness.  I have recommended referring him for nerve studies of the right upper extremity.  I told him that I will call him with the results.  I also think he could benefit from physical therapy and I have given him a referral for this.    Lele Blackmon MD    09/24/2018    CC to Lyell, Reggie Duane, MD

## 2018-10-11 ENCOUNTER — OFFICE VISIT (OUTPATIENT)
Dept: NEUROSURGERY | Facility: CLINIC | Age: 43
End: 2018-10-11

## 2018-10-11 VITALS
DIASTOLIC BLOOD PRESSURE: 90 MMHG | HEIGHT: 70 IN | SYSTOLIC BLOOD PRESSURE: 132 MMHG | BODY MASS INDEX: 41.09 KG/M2 | WEIGHT: 287 LBS

## 2018-10-11 DIAGNOSIS — M47.12 CERVICAL SPONDYLOSIS WITH MYELOPATHY: ICD-10-CM

## 2018-10-11 DIAGNOSIS — R29.898 RIGHT ARM WEAKNESS: Primary | ICD-10-CM

## 2018-10-11 PROCEDURE — 99024 POSTOP FOLLOW-UP VISIT: CPT | Performed by: NEUROLOGICAL SURGERY

## 2018-10-11 NOTE — PROGRESS NOTES
Subjective   Patient ID: Brad A Schoen is a 43 y.o. male who is here today for follow-up status post an ACDF on 7/13/18. He presents unaccompanied.     History of Present Illness Patient reports doing ok.  No swalowing issues.  No neck pain.  Has some stiffness.  He reports his arm is getting better.  He is scheduled for a nerve test Monday.  He had negative xrays.  He reports his arm tires but he is improved. He reports gait and fine motor tasking issues.  He had fine motor issues pre-op.     The following portions of the patient's history were reviewed and updated as appropriate: allergies, current medications, past family history, past medical history, past social history, past surgical history and problem list.    Review of Systems   Musculoskeletal: Negative for arthralgias, neck pain and neck stiffness.   Neurological: Negative for weakness and numbness.       Objective   Physical Exam   Neurological: Gait normal.     Neurologic Exam     Sensory Exam   Right arm light touch: normal  Left arm light touch: normal  Right arm pinprick: normal  Left arm pinprick: normal    Gait, Coordination, and Reflexes     Gait  Gait: normal    Reflexes   Right Anaya: absent  Left Anaya: absent  Right ankle clonus: absent  Left ankle clonus: absent     Strength   Right deltoid: 4+/5 (improved)  Left deltoid: 5/5  Right biceps: 5/5  Left biceps: 5/5  Right triceps: 5/5  Left triceps: 5/5  Right wrist flexion: 5/5  Left wrist flexion: 5/5  Right interossei: 5/5  Left interossei: 5/5    C/d/i    Assessment/Plan   Independent Review of Radiographic Studies:    n/a  Medical Decision Making: 3 months out with significant improvement in motor and sensory issues.   Tingling and pain are resolved.  He is scheduled to end his disability tomorrow.  He works for [employer].  He is still getting a EMG/NCV and follow up with orthopaedics next week so we will keep him off until these are completed.  I will see him in 3 months to follow his  weakness.     Chu was seen today for post-op.    Diagnoses and all orders for this visit:    Right arm weakness    Cervical spondylosis with myelopathy      No Follow-up on file.

## 2018-10-15 ENCOUNTER — HOSPITAL ENCOUNTER (OUTPATIENT)
Dept: INFUSION THERAPY | Facility: HOSPITAL | Age: 43
Discharge: HOME | End: 2018-10-15
Attending: ORTHOPAEDIC SURGERY

## 2018-12-10 ENCOUNTER — CONVERSION ENCOUNTER (OUTPATIENT)
Dept: FAMILY MEDICINE CLINIC | Facility: CLINIC | Age: 43
End: 2018-12-10

## 2019-01-02 ENCOUNTER — TELEPHONE (OUTPATIENT)
Dept: NEUROSURGERY | Facility: CLINIC | Age: 44
End: 2019-01-02

## 2019-01-02 NOTE — TELEPHONE ENCOUNTER
In doing chart prep, I see patient did not keep appt for EMG/NCV and FU w/ortho. Dr. Crowder wanted both of these prior to his 3M follow-up on 1-9-19.     I spoke to patient. He is doing well and is cancelling follow-ups.

## 2019-05-20 ENCOUNTER — CONVERSION ENCOUNTER (OUTPATIENT)
Dept: FAMILY MEDICINE CLINIC | Facility: CLINIC | Age: 44
End: 2019-05-20

## 2019-06-04 VITALS
SYSTOLIC BLOOD PRESSURE: 122 MMHG | DIASTOLIC BLOOD PRESSURE: 85 MMHG | OXYGEN SATURATION: 100 % | OXYGEN SATURATION: 98 % | WEIGHT: 278.13 LBS | WEIGHT: 278.13 LBS | HEIGHT: 70 IN | HEIGHT: 70 IN | RESPIRATION RATE: 18 BRPM | WEIGHT: 262 LBS | HEART RATE: 87 BPM | DIASTOLIC BLOOD PRESSURE: 92 MMHG | HEART RATE: 105 BPM | RESPIRATION RATE: 18 BRPM | SYSTOLIC BLOOD PRESSURE: 133 MMHG | SYSTOLIC BLOOD PRESSURE: 140 MMHG | WEIGHT: 262.5 LBS | SYSTOLIC BLOOD PRESSURE: 130 MMHG | WEIGHT: 277.13 LBS | OXYGEN SATURATION: 98 % | SYSTOLIC BLOOD PRESSURE: 154 MMHG | SYSTOLIC BLOOD PRESSURE: 121 MMHG | HEART RATE: 100 BPM | HEART RATE: 86 BPM | WEIGHT: 280 LBS | HEIGHT: 70 IN | SYSTOLIC BLOOD PRESSURE: 152 MMHG | OXYGEN SATURATION: 99 % | OXYGEN SATURATION: 97 % | WEIGHT: 264.2 LBS | BODY MASS INDEX: 40.09 KG/M2 | DIASTOLIC BLOOD PRESSURE: 85 MMHG | WEIGHT: 263 LBS | BODY MASS INDEX: 39.67 KG/M2 | RESPIRATION RATE: 16 BRPM | HEART RATE: 82 BPM | DIASTOLIC BLOOD PRESSURE: 94 MMHG | OXYGEN SATURATION: 99 % | HEART RATE: 80 BPM | HEART RATE: 98 BPM | WEIGHT: 259 LBS | HEART RATE: 90 BPM | BODY MASS INDEX: 39.82 KG/M2 | HEART RATE: 106 BPM | WEIGHT: 288.4 LBS | OXYGEN SATURATION: 98 % | HEART RATE: 86 BPM | SYSTOLIC BLOOD PRESSURE: 139 MMHG | WEIGHT: 274 LBS | BODY MASS INDEX: 41.29 KG/M2 | BODY MASS INDEX: 39.82 KG/M2 | OXYGEN SATURATION: 98 % | BODY MASS INDEX: 39.22 KG/M2 | SYSTOLIC BLOOD PRESSURE: 166 MMHG | HEIGHT: 70 IN | DIASTOLIC BLOOD PRESSURE: 85 MMHG | HEART RATE: 81 BPM | DIASTOLIC BLOOD PRESSURE: 83 MMHG | DIASTOLIC BLOOD PRESSURE: 92 MMHG | DIASTOLIC BLOOD PRESSURE: 94 MMHG | HEART RATE: 85 BPM | DIASTOLIC BLOOD PRESSURE: 94 MMHG | OXYGEN SATURATION: 99 % | SYSTOLIC BLOOD PRESSURE: 144 MMHG | SYSTOLIC BLOOD PRESSURE: 129 MMHG | WEIGHT: 265.38 LBS | HEART RATE: 97 BPM | DIASTOLIC BLOOD PRESSURE: 105 MMHG | RESPIRATION RATE: 16 BRPM | HEART RATE: 88 BPM | HEART RATE: 86 BPM | HEART RATE: 98 BPM | DIASTOLIC BLOOD PRESSURE: 95 MMHG | HEIGHT: 70 IN | RESPIRATION RATE: 16 BRPM | RESPIRATION RATE: 16 BRPM | RESPIRATION RATE: 18 BRPM | WEIGHT: 245 LBS | DIASTOLIC BLOOD PRESSURE: 97 MMHG | BODY MASS INDEX: 37.19 KG/M2 | RESPIRATION RATE: 18 BRPM | DIASTOLIC BLOOD PRESSURE: 90 MMHG | BODY MASS INDEX: 38.02 KG/M2 | OXYGEN SATURATION: 100 % | DIASTOLIC BLOOD PRESSURE: 97 MMHG | RESPIRATION RATE: 16 BRPM | OXYGEN SATURATION: 99 % | RESPIRATION RATE: 18 BRPM | SYSTOLIC BLOOD PRESSURE: 131 MMHG | HEART RATE: 101 BPM | HEIGHT: 70 IN | HEIGHT: 70 IN | SYSTOLIC BLOOD PRESSURE: 144 MMHG | OXYGEN SATURATION: 98 % | HEIGHT: 70 IN | SYSTOLIC BLOOD PRESSURE: 143 MMHG | RESPIRATION RATE: 18 BRPM | HEART RATE: 82 BPM | SYSTOLIC BLOOD PRESSURE: 144 MMHG | DIASTOLIC BLOOD PRESSURE: 95 MMHG | OXYGEN SATURATION: 98 % | SYSTOLIC BLOOD PRESSURE: 152 MMHG | RESPIRATION RATE: 16 BRPM | WEIGHT: 259.8 LBS | WEIGHT: 265 LBS | DIASTOLIC BLOOD PRESSURE: 94 MMHG | RESPIRATION RATE: 18 BRPM | WEIGHT: 275.5 LBS | WEIGHT: 264 LBS | WEIGHT: 263 LBS | SYSTOLIC BLOOD PRESSURE: 127 MMHG | OXYGEN SATURATION: 97 % | DIASTOLIC BLOOD PRESSURE: 110 MMHG | RESPIRATION RATE: 16 BRPM | DIASTOLIC BLOOD PRESSURE: 86 MMHG | OXYGEN SATURATION: 100 % | OXYGEN SATURATION: 100 % | BODY MASS INDEX: 39.44 KG/M2 | RESPIRATION RATE: 18 BRPM | SYSTOLIC BLOOD PRESSURE: 160 MMHG | RESPIRATION RATE: 20 BRPM | RESPIRATION RATE: 18 BRPM

## 2019-06-04 VITALS
BODY MASS INDEX: 41.92 KG/M2 | OXYGEN SATURATION: 98 % | DIASTOLIC BLOOD PRESSURE: 102 MMHG | HEIGHT: 70 IN | WEIGHT: 292.8 LBS | SYSTOLIC BLOOD PRESSURE: 161 MMHG | HEART RATE: 96 BPM | RESPIRATION RATE: 18 BRPM

## 2019-09-18 ENCOUNTER — TELEPHONE (OUTPATIENT)
Dept: FAMILY MEDICINE CLINIC | Facility: CLINIC | Age: 44
End: 2019-09-18

## 2019-09-18 NOTE — TELEPHONE ENCOUNTER
Pt has had a head cold for 5 days and has tried OTC with no results.   Will you call in something for him?  King nugent

## 2019-12-04 ENCOUNTER — TELEPHONE (OUTPATIENT)
Dept: FAMILY MEDICINE CLINIC | Facility: CLINIC | Age: 44
End: 2019-12-04

## 2019-12-04 NOTE — TELEPHONE ENCOUNTER
Patient has been sick with a cold and hasn't been able to come in for an appt. He works 6 days a week and cant take off. Wanting to know if you would send in a z pack for him.

## 2019-12-30 ENCOUNTER — OFFICE VISIT (OUTPATIENT)
Dept: FAMILY MEDICINE CLINIC | Facility: CLINIC | Age: 44
End: 2019-12-30

## 2019-12-30 VITALS
RESPIRATION RATE: 18 BRPM | HEIGHT: 70 IN | OXYGEN SATURATION: 96 % | SYSTOLIC BLOOD PRESSURE: 129 MMHG | BODY MASS INDEX: 42.8 KG/M2 | TEMPERATURE: 98.1 F | HEART RATE: 91 BPM | WEIGHT: 299 LBS | DIASTOLIC BLOOD PRESSURE: 82 MMHG

## 2019-12-30 DIAGNOSIS — M54.12 CERVICAL RADICULOPATHY: Primary | ICD-10-CM

## 2019-12-30 DIAGNOSIS — R29.898 RIGHT ARM WEAKNESS: ICD-10-CM

## 2019-12-30 PROBLEM — R03.0 ELEVATED BLOOD PRESSURE READING WITHOUT DIAGNOSIS OF HYPERTENSION: Status: ACTIVE | Noted: 2018-01-08

## 2019-12-30 PROBLEM — F41.9 ANXIETY AND DEPRESSION: Status: ACTIVE | Noted: 2019-12-30

## 2019-12-30 PROBLEM — F32.A ANXIETY AND DEPRESSION: Status: ACTIVE | Noted: 2019-12-30

## 2019-12-30 PROBLEM — N17.9 ACUTE RENAL FAILURE (HCC): Status: ACTIVE | Noted: 2019-12-30

## 2019-12-30 PROCEDURE — 99213 OFFICE O/P EST LOW 20 MIN: CPT | Performed by: FAMILY MEDICINE

## 2019-12-30 RX ORDER — HYDROXYZINE HYDROCHLORIDE 25 MG/1
TABLET, FILM COATED ORAL
COMMUNITY
Start: 2019-05-20 | End: 2020-11-04

## 2019-12-30 RX ORDER — ESCITALOPRAM OXALATE 10 MG/1
TABLET ORAL EVERY 24 HOURS
COMMUNITY
Start: 2019-05-08 | End: 2020-11-04

## 2019-12-30 NOTE — PROGRESS NOTES
Patient with history of cervical disc disease and previous neck surgery presents with persistent right arm weakness.  He does pretty well except when using the arm for extended periods of time.  His work has lately been requiring prolonged shifts and 7 days a week which have aggravated the arm pain and weakness.  Limiting the activity during flareups has resulted in improvement in symptoms.  He is needing FMLA forms completed to address this.    Past Medical History:   Diagnosis Date   • Cervical disc disorder    • Indigestion      Past Surgical History:   Procedure Laterality Date   • ANTERIOR CERVICAL DISCECTOMY W/ FUSION Left 2018    Procedure: CERVICAL DISCECTOMY ANTERIOR WITH ATLANTIS cervical 456, possible c5 corpectomy with anterior fixation from c4-6, use of allograft;  Surgeon: Larry Crowder IV, MD;  Location: [facility];  Service: Neurosurgery   • BACK SURGERY       Dr. Keller  & Dr. Corral    • KNEE SURGERY Right    • LAPAROSCOPIC CHOLECYSTECTOMY     • NECK SURGERY  2018     Family History   Problem Relation Age of Onset   • No Known Problems Mother    • Malig Hyperthermia Neg Hx      Social History     Tobacco Use   • Smoking status: Former Smoker     Last attempt to quit:      Years since quittin.0   • Smokeless tobacco: Never Used   Substance Use Topics   • Alcohol use: Yes     Comment: occ     Review of Systems   Constitutional: Negative for chills and fatigue.   Respiratory: Negative for cough and shortness of breath.    Cardiovascular: Negative for chest pain and palpitations.   Musculoskeletal: Positive for arthralgias, myalgias, neck pain and neck stiffness. Negative for back pain.   Skin: Negative for rash.   Neurological: Positive for weakness and numbness. Negative for dizziness and headache.     Vitals:    19 1402   BP: 129/82   BP Location: Left arm   Patient Position: Sitting   Cuff Size: Large Adult   Pulse: 91   Resp: 18   Temp: 98.1 °F (36.7  "°C)   TempSrc: Oral   SpO2: 96%   Weight: 136 kg (299 lb)   Height: 177.8 cm (70\")     Body mass index is 42.9 kg/m².  Physical Exam   Constitutional: He is oriented to person, place, and time. He appears well-developed and well-nourished. No distress.   Cardiovascular: Normal rate, regular rhythm and normal heart sounds.   No murmur heard.  Pulmonary/Chest: Effort normal and breath sounds normal. He has no wheezes. He has no rales.   Abdominal: Soft. Bowel sounds are normal. He exhibits no distension.   Musculoskeletal: Normal range of motion.   Neurological: He is alert and oriented to person, place, and time.   Skin: Skin is warm and dry.   Psychiatric: He has a normal mood and affect. His behavior is normal.           Diagnoses and all orders for this visit:    1. Cervical radiculopathy (Primary)  Assessment & Plan:  Patient does well when he is working routine hours but when overusing the arm he develops weakness associated with pain in the neck and radiation down the right arm.  I think it is very reasonable for him to limit his activities during flareups.  [facility] paperwork completed stating this and scanned into chart.      2. Right arm weakness    "

## 2019-12-31 NOTE — ASSESSMENT & PLAN NOTE
Patient does well when he is working routine hours but when overusing the arm he develops weakness associated with pain in the neck and radiation down the right arm.  I think it is very reasonable for him to limit his activities during flareups.  [facility] paperwork completed stating this and scanned into chart.

## 2020-01-20 ENCOUNTER — OFFICE VISIT (OUTPATIENT)
Dept: FAMILY MEDICINE CLINIC | Facility: CLINIC | Age: 45
End: 2020-01-20

## 2020-01-20 VITALS
TEMPERATURE: 97.9 F | RESPIRATION RATE: 16 BRPM | HEART RATE: 93 BPM | SYSTOLIC BLOOD PRESSURE: 136 MMHG | WEIGHT: 300 LBS | BODY MASS INDEX: 42.95 KG/M2 | OXYGEN SATURATION: 98 % | HEIGHT: 70 IN | DIASTOLIC BLOOD PRESSURE: 85 MMHG

## 2020-01-20 DIAGNOSIS — M54.12 CERVICAL RADICULOPATHY: Primary | ICD-10-CM

## 2020-01-20 DIAGNOSIS — H60.332 ACUTE SWIMMER'S EAR OF LEFT SIDE: ICD-10-CM

## 2020-01-20 PROCEDURE — 99213 OFFICE O/P EST LOW 20 MIN: CPT | Performed by: FAMILY MEDICINE

## 2020-01-20 NOTE — ASSESSMENT & PLAN NOTE
I rewrote his [facility] paperwork to account for recommendations limiting him to 40 hours/week.  Copy scanned into chart.

## 2020-01-20 NOTE — PROGRESS NOTES
Patient presents for follow-up regarding paperwork surrounding his [facility] information for his work.  He has cervical disc disease with arm weakness and does well when he does no more than 8 hours/day.  However, his work will not let the [facility] paperwork go through for that restriction.  They wanted to be 40 hours/week and not a daily limit in order for him to keep his job.  He thinks he can do 10-hour days with a maximum of 40/week.  Also he complains of some left ear tenderness.  He has not had fever or hearing loss.    Past Medical History:   Diagnosis Date   • Anxiety    • Cervical disc disorder    • DDD (degenerative disc disease), lumbar    • Depression    • Indigestion      Past Surgical History:   Procedure Laterality Date   • ANTERIOR CERVICAL DISCECTOMY W/ FUSION Left 2018    Procedure: CERVICAL DISCECTOMY ANTERIOR WITH ATLANTIS cervical 456, possible c5 corpectomy with anterior fixation from c4-6, use of allograft;  Surgeon: Larry Crowder IV, MD;  Location: [facility];  Service: Neurosurgery   • BACK SURGERY       Dr. Keller  & Dr. Corral    • KNEE SURGERY Right    • LAPAROSCOPIC CHOLECYSTECTOMY     • NECK SURGERY  2018     Family History   Problem Relation Age of Onset   • No Known Problems Mother    • Malig Hyperthermia Neg Hx      Social History     Tobacco Use   • Smoking status: Former Smoker     Types: Cigarettes     Last attempt to quit: 2015     Years since quittin.0   • Smokeless tobacco: Never Used   Substance Use Topics   • Alcohol use: Yes     Comment: occ     PHQ-2 Depression Screening  Little interest or pleasure in doing things? 0   Feeling down, depressed, or hopeless? 0   PHQ-2 Total Score 0     Review of Systems   Constitutional: Negative for chills, fatigue and fever.   HENT: Positive for ear discharge and ear pain. Negative for hearing loss.         See HPI   Respiratory: Negative for cough and shortness of breath.    Cardiovascular: Negative for chest  "pain and palpitations.   Gastrointestinal: Negative for constipation, diarrhea, nausea and vomiting.   Musculoskeletal: Positive for arthralgias, myalgias, neck pain and neck stiffness. Negative for back pain.   Skin: Negative for rash.   Neurological: Positive for weakness and numbness. Negative for dizziness and headache.     Vitals:    01/20/20 0946   BP: 136/85   BP Location: Left arm   Patient Position: Sitting   Cuff Size: Large Adult   Pulse: 93   Resp: 16   Temp: 97.9 °F (36.6 °C)   TempSrc: Oral   SpO2: 98%   Weight: 136 kg (300 lb)   Height: 177.8 cm (70\")     Body mass index is 43.05 kg/m².  Physical Exam   Constitutional: He is oriented to person, place, and time. He appears well-developed and well-nourished. No distress.   HENT:   Head: Normocephalic and atraumatic.   Right Ear: Hearing, tympanic membrane and external ear normal.   Left Ear: Hearing and tympanic membrane normal.   Nose: Nose normal.   Mouth/Throat: Oropharynx is clear and moist.   Small abrasion of left otic canal with eschar   Eyes: Pupils are equal, round, and reactive to light. EOM are normal.   Neck: Normal range of motion. Neck supple.   Cardiovascular: Normal rate, regular rhythm and normal heart sounds.   No murmur heard.  Pulmonary/Chest: Effort normal and breath sounds normal. He has no wheezes. He has no rales.   Abdominal: Soft. Bowel sounds are normal. He exhibits no distension.   Musculoskeletal: Normal range of motion.   Neurological: He is alert and oriented to person, place, and time.   Skin: Skin is warm and dry.   Psychiatric: He has a normal mood and affect. His behavior is normal.     No visits with results within 7 Day(s) from this visit.   Latest known visit with results is:   Appointment on 07/11/2018   Component Date Value Ref Range Status   • Glucose 07/11/2018 105* 65 - 99 mg/dL Final   • BUN 07/11/2018 18  6 - 20 mg/dL Final   • Creatinine 07/11/2018 0.72* 0.76 - 1.27 mg/dL Final   • Sodium 07/11/2018 137  136 " - 145 mmol/L Final   • Potassium 07/11/2018 4.1  3.5 - 5.2 mmol/L Final   • Chloride 07/11/2018 102  98 - 107 mmol/L Final   • CO2 07/11/2018 21.0* 22.0 - 29.0 mmol/L Final   • Calcium 07/11/2018 9.5  8.6 - 10.5 mg/dL Final   • Total Protein 07/11/2018 7.1  6.0 - 8.5 g/dL Final   • Albumin 07/11/2018 4.30  3.50 - 5.20 g/dL Final   • ALT (SGPT) 07/11/2018 36  1 - 41 U/L Final   • AST (SGOT) 07/11/2018 15  1 - 40 U/L Final   • Alkaline Phosphatase 07/11/2018 74  39 - 117 U/L Final   • Total Bilirubin 07/11/2018 0.2  0.1 - 1.2 mg/dL Final   • eGFR Non African Amer 07/11/2018 119  >60 mL/min/1.73 Final   • Globulin 07/11/2018 2.8  gm/dL Final   • A/G Ratio 07/11/2018 1.5  g/dL Final   • BUN/Creatinine Ratio 07/11/2018 25.0  7.0 - 25.0 Final   • Anion Gap 07/11/2018 14.0  mmol/L Final   • PTT 07/11/2018 26.5  22.7 - 35.4 seconds Final   • Protime 07/11/2018 12.2  11.7 - 14.2 Seconds Final   • INR 07/11/2018 0.92  0.90 - 1.10 Final   • WBC 07/11/2018 7.89  4.50 - 10.70 10*3/mm3 Final   • RBC 07/11/2018 4.68  4.60 - 6.00 10*6/mm3 Final   • Hemoglobin 07/11/2018 15.9  13.7 - 17.6 g/dL Final   • Hematocrit 07/11/2018 46.3  40.4 - 52.2 % Final   • MCV 07/11/2018 98.9* 79.8 - 96.2 fL Final   • MCH 07/11/2018 34.0* 27.0 - 32.7 pg Final   • MCHC 07/11/2018 34.3  32.6 - 36.4 g/dL Final   • RDW 07/11/2018 13.7  11.5 - 14.5 % Final   • RDW-SD 07/11/2018 49.4  37.0 - 54.0 fl Final   • MPV 07/11/2018 9.7  6.0 - 12.0 fL Final   • Platelets 07/11/2018 404  140 - 500 10*3/mm3 Final   • Neutrophil % 07/11/2018 68.8  42.7 - 76.0 % Final   • Lymphocyte % 07/11/2018 17.7* 19.6 - 45.3 % Final   • Monocyte % 07/11/2018 9.1  5.0 - 12.0 % Final   • Eosinophil % 07/11/2018 4.1  0.3 - 6.2 % Final   • Basophil % 07/11/2018 0.3  0.0 - 1.5 % Final   • Immature Grans % 07/11/2018 0.3  0.0 - 0.5 % Final   • Neutrophils, Absolute 07/11/2018 5.43  1.90 - 8.10 10*3/mm3 Final   • Lymphocytes, Absolute 07/11/2018 1.40  0.90 - 4.80 10*3/mm3 Final   •  Monocytes, Absolute 07/11/2018 0.72  0.20 - 1.20 10*3/mm3 Final   • Eosinophils, Absolute 07/11/2018 0.32  0.00 - 0.70 10*3/mm3 Final   • Basophils, Absolute 07/11/2018 0.02  0.00 - 0.20 10*3/mm3 Final   • Immature Grans, Absolute 07/11/2018 0.02  0.00 - 0.03 10*3/mm3 Final     No results found for: HGBA1C  Lab Results   Component Value Date    GLUCOSE 105 (H) 07/11/2018    BUN 18 07/11/2018    CREATININE 0.72 (L) 07/11/2018    EGFRIFNONA 119 07/11/2018    BCR 25.0 07/11/2018    K 4.1 07/11/2018    CO2 21.0 (L) 07/11/2018    CALCIUM 9.5 07/11/2018    ALBUMIN 4.30 07/11/2018    AST 15 07/11/2018    ALT 36 07/11/2018     Lab Results   Component Value Date    WBC 7.89 07/11/2018    HGB 15.9 07/11/2018    HCT 46.3 07/11/2018    MCV 98.9 (H) 07/11/2018     07/11/2018     No results found for: PSA      Diagnoses and all orders for this visit:    1. Cervical radiculopathy (Primary)  Assessment & Plan:  I rewrote his [facility] paperwork to account for recommendations limiting him to 40 hours/week.  Copy scanned into chart.      2. Acute swimmer's ear of left side  Assessment & Plan:  I think he has an abrasion from cleaning his ear.  OTC eardrops and recheck for persistence.

## 2020-06-24 ENCOUNTER — HOSPITAL ENCOUNTER (EMERGENCY)
Facility: HOSPITAL | Age: 45
Discharge: HOME | End: 2020-06-24
Attending: EMERGENCY MEDICINE | Admitting: EMERGENCY MEDICINE

## 2020-06-24 VITALS
HEART RATE: 99 BPM | SYSTOLIC BLOOD PRESSURE: 165 MMHG | DIASTOLIC BLOOD PRESSURE: 85 MMHG | OXYGEN SATURATION: 99 % | BODY MASS INDEX: 44.98 KG/M2 | HEIGHT: 70 IN | RESPIRATION RATE: 19 BRPM | WEIGHT: 314.16 LBS | TEMPERATURE: 97.5 F

## 2020-06-24 DIAGNOSIS — M79.601 RIGHT ARM PAIN: Primary | ICD-10-CM

## 2020-06-24 PROCEDURE — 96372 THER/PROPH/DIAG INJ SC/IM: CPT

## 2020-06-24 PROCEDURE — 25010000002 METHYLPREDNISOLONE PER 125 MG: Performed by: EMERGENCY MEDICINE

## 2020-06-24 PROCEDURE — 99282 EMERGENCY DEPT VISIT SF MDM: CPT

## 2020-06-24 RX ORDER — HYDROCODONE BITARTRATE AND ACETAMINOPHEN 7.5; 325 MG/1; MG/1
1 TABLET ORAL EVERY 6 HOURS PRN
Qty: 12 TABLET | Refills: 0 | OUTPATIENT
Start: 2020-06-24 | End: 2020-11-04

## 2020-06-24 RX ORDER — METHYLPREDNISOLONE SODIUM SUCCINATE 125 MG/2ML
125 INJECTION, POWDER, LYOPHILIZED, FOR SOLUTION INTRAMUSCULAR; INTRAVENOUS ONCE
Status: COMPLETED | OUTPATIENT
Start: 2020-06-24 | End: 2020-06-24

## 2020-06-24 RX ORDER — METHYLPREDNISOLONE 4 MG/1
TABLET ORAL
Qty: 21 TABLET | Refills: 0 | OUTPATIENT
Start: 2020-06-24 | End: 2020-11-04

## 2020-06-24 RX ADMIN — METHYLPREDNISOLONE SODIUM SUCCINATE 125 MG: 125 INJECTION, POWDER, FOR SOLUTION INTRAMUSCULAR; INTRAVENOUS at 05:11

## 2020-06-24 NOTE — ED PROVIDER NOTES
Subjective   Chief complaint: Right arm pain    44-year-old male presents with right arm pain.  Patient states the pain starts in the right shoulder and radiates into the right bicep area.  He states he has had a pinched nerve in the past and this feels similar.  He denies any specific injury.  He states he did some swimming over the weekend and then the pain started on Monday and has gotten progressively worse.  He denies any numbness, weakness, tingling.  He has had no swelling.  He denies any fever.  Pain is worse with certain movements.      History provided by:  Patient      Review of Systems   Constitutional: Negative for fatigue and fever.   HENT: Negative for congestion.    Respiratory: Negative for cough and shortness of breath.    Cardiovascular: Negative for chest pain.   Gastrointestinal: Negative for abdominal pain and vomiting.   Musculoskeletal: Negative for neck pain.   Neurological: Negative for weakness and numbness.       Past Medical History:   Diagnosis Date   • Anxiety    • Cervical disc disorder    • DDD (degenerative disc disease), lumbar    • Depression    • Indigestion     occ       Allergies   Allergen Reactions   • Contrast Dye Rash       Past Surgical History:   Procedure Laterality Date   • ANTERIOR CERVICAL DISCECTOMY W/ FUSION Left 7/13/2018    Procedure: CERVICAL DISCECTOMY ANTERIOR WITH ATLANTIS cervical 456, possible c5 corpectomy with anterior fixation from c4-6, use of allograft;  Surgeon: Larry Crowder IV, MD;  Location: [facility];  Service: Neurosurgery   • BACK SURGERY       Dr. Keller 2010 & Dr. Corral 2014   • KNEE SURGERY Right 2013   • LAPAROSCOPIC CHOLECYSTECTOMY     • NECK SURGERY  07/13/2018       Family History   Problem Relation Age of Onset   • No Known Problems Mother    • Malig Hyperthermia Neg Hx        Social History     Socioeconomic History   • Marital status:      Spouse name: Not on file   • Number of children: Not on file   • Years of  "education: Not on file   • Highest education level: Not on file   Occupational History   • Occupation:      Comment: Full Time (laid off currently)    Tobacco Use   • Smoking status: Former Smoker     Types: Cigarettes     Last attempt to quit: 2015     Years since quittin.4   • Smokeless tobacco: Never Used   Substance and Sexual Activity   • Alcohol use: Yes     Comment: occ   • Drug use: No   • Sexual activity: Defer       /98 (BP Location: Right arm, Patient Position: Sitting)   Pulse 107   Temp 97.5 °F (36.4 °C) (Oral)   Resp 22   Ht 177.8 cm (70\")   Wt (!) 143 kg (314 lb 2.5 oz)   SpO2 99%   BMI 45.08 kg/m²       Objective   Physical Exam   Constitutional: He is oriented to person, place, and time. He appears well-developed and well-nourished.   HENT:   Head: Normocephalic and atraumatic.   Eyes: Pupils are equal, round, and reactive to light.   Neck: Normal range of motion. Neck supple.   Cardiovascular: Normal rate, regular rhythm and normal heart sounds.   Pulmonary/Chest: Effort normal and breath sounds normal. No respiratory distress.   Musculoskeletal:   There is no tenderness or swelling to the right shoulder or upper arm.  There is some pain with range of motion.  Neurovascular intact distally, extremities otherwise negative.   Neurological: He is alert and oriented to person, place, and time.   Skin: Skin is warm and dry.   Nursing note and vitals reviewed.      Procedures           ED Course                                           MDM   Patient was given a dose of Solu-Medrol in the emergency room.  He will be discharged with a prescription for Norco as well as a Medrol Dosepak.       Final diagnoses:   Right arm pain            Hernán Recinos MD  20 [id number]    "

## 2020-06-24 NOTE — DISCHARGE INSTRUCTIONS
Follow-up with your primary doctor as well as neurosurgery as directed.  Return to the emergency room for any new or worsening symptoms or if you have any other questions or concerns.  Take medication as prescribed.  Do not drive or drink alcohol while taking pain medication.

## 2020-11-18 PROCEDURE — U0003 INFECTIOUS AGENT DETECTION BY NUCLEIC ACID (DNA OR RNA); SEVERE ACUTE RESPIRATORY SYNDROME CORONAVIRUS 2 (SARS-COV-2) (CORONAVIRUS DISEASE [COVID-19]), AMPLIFIED PROBE TECHNIQUE, MAKING USE OF HIGH THROUGHPUT TECHNOLOGIES AS DESCRIBED BY CMS-2020-01-R: HCPCS | Performed by: FAMILY MEDICINE

## 2021-03-21 ENCOUNTER — HOSPITAL ENCOUNTER (EMERGENCY)
Facility: HOSPITAL | Age: 46
Discharge: HOME | End: 2021-03-21
Admitting: EMERGENCY MEDICINE

## 2021-03-21 VITALS
HEIGHT: 70 IN | HEART RATE: 95 BPM | DIASTOLIC BLOOD PRESSURE: 88 MMHG | BODY MASS INDEX: 43.46 KG/M2 | TEMPERATURE: 98.1 F | RESPIRATION RATE: 17 BRPM | WEIGHT: 303.57 LBS | OXYGEN SATURATION: 100 % | SYSTOLIC BLOOD PRESSURE: 136 MMHG

## 2021-03-21 DIAGNOSIS — S60.552A: Primary | ICD-10-CM

## 2021-03-21 PROCEDURE — 99282 EMERGENCY DEPT VISIT SF MDM: CPT

## 2021-03-21 PROCEDURE — 25010000002 TDAP 5-2.5-18.5 LF-MCG/0.5 SUSPENSION: Performed by: NURSE PRACTITIONER

## 2021-03-21 PROCEDURE — 90715 TDAP VACCINE 7 YRS/> IM: CPT | Performed by: NURSE PRACTITIONER

## 2021-03-21 PROCEDURE — 90471 IMMUNIZATION ADMIN: CPT | Performed by: NURSE PRACTITIONER

## 2021-03-21 RX ORDER — CEPHALEXIN 500 MG/1
500 CAPSULE ORAL 3 TIMES DAILY
Qty: 21 CAPSULE | Refills: 0 | Status: SHIPPED | OUTPATIENT
Start: 2021-03-21 | End: 2021-03-28

## 2021-03-21 RX ADMIN — TETANUS TOXOID, REDUCED DIPHTHERIA TOXOID AND ACELLULAR PERTUSSIS VACCINE, ADSORBED 0.5 ML: 5; 2.5; 8; 8; 2.5 SUSPENSION INTRAMUSCULAR at 16:26

## 2021-03-21 NOTE — ED PROVIDER NOTES
Subjective   Patient is a 45-year-old white male with no significant medical history presents with complaints of foreign body to the palm of his left hand.  He states he incurred the foreign body after the wooden handle on his ax broke.  States he was unable to remove the foreign object at home.  He denies any numbness tingling or weakness distal to the injury.          Review of Systems   Skin: Positive for wound.        Foreign body palm of left hand       Past Medical History:   Diagnosis Date   • Anxiety    • Cervical disc disorder    • DDD (degenerative disc disease), lumbar    • Depression    • Indigestion     occ       Allergies   Allergen Reactions   • Contrast Dye Rash       Past Surgical History:   Procedure Laterality Date   • ANTERIOR CERVICAL DISCECTOMY W/ FUSION Left 2018    Procedure: CERVICAL DISCECTOMY ANTERIOR WITH ATLANTIS cervical 456, possible c5 corpectomy with anterior fixation from c4-6, use of allograft;  Surgeon: Larry Crowder IV, MD;  Location: [facility];  Service: Neurosurgery   • BACK SURGERY       Dr. Keller  & Dr. Corral    • KNEE SURGERY Right    • LAPAROSCOPIC CHOLECYSTECTOMY     • NECK SURGERY  2018       Family History   Problem Relation Age of Onset   • No Known Problems Mother    • Malig Hyperthermia Neg Hx        Social History     Socioeconomic History   • Marital status:      Spouse name: Not on file   • Number of children: Not on file   • Years of education: Not on file   • Highest education level: Not on file   Tobacco Use   • Smoking status: Current Some Day Smoker     Types: Cigarettes     Last attempt to quit: 2015     Years since quittin.2   • Smokeless tobacco: Never Used   • Tobacco comment: aprox 10 cigarettes per week   Vaping Use   • Vaping Use: Never used   Substance and Sexual Activity   • Alcohol use: Yes     Comment: occ   • Drug use: No   • Sexual activity: Defer           Objective   Physical Exam    Vital signs and  triage nurse note reviewed.  Constitutional: Awake, alert; well-developed and well-nourished. No acute distress is noted.  Cardiovascular: Regular rate and rhythm  Pulmonary: Respiratory effort regular nonlabored  Musculoskeletal: Independent range of motion of all extremities with no palpable tenderness or edema.  Neuro: Alert oriented x3, speech is clear and appropriate, GCS 15.    Skin: Flesh tone, warm, dry.  There is a wooden foreign body noted to the palmar aspect of the left hand.  It enters in the mid palm between the second and third metacarpal and exits at the ulnar aspect of the second MCP joint.  There is no active bleeding.  There is good strength against resistance with flexion and extension of all digits.  There is good cap refill and sensation distally.        Foreign Body Removal - Embedded    Date/Time: 3/21/2021 4:16 PM  Performed by: Maureen Cai APRN  Authorized by: Maureen Cai APRN     Consent:     Consent obtained:  Verbal    Consent given by:  Patient    Risks discussed:  Bleeding, infection, pain, worsening of condition and incomplete removal  Location:     Location:  Hand    Hand location:  L palm    Depth:  Subcutaneous    Tendon involvement:  None  Pre-procedure details:     Imaging:  None    Neurovascular status: intact      Preparation: Patient was prepped and draped in usual sterile fashion    Anesthesia (see MAR for exact dosages):     Anesthesia method:  Local infiltration    Local anesthetic:  Lidocaine 2% w/o epi and bupivacaine 0.25% w/o epi  Procedure type:     Procedure complexity:  Simple  Procedure details:     Localization method:  Visualized    Removal mechanism:  Hemostat    Foreign bodies recovered:  1    Intact foreign body removal: yes    Post-procedure details:     Neurovascular status: intact      Confirmation:  No additional foreign bodies on visualization    Skin closure:  None (irrigated with ns prior to dressing)    Dressing:  Non-adherent dressing     Patient tolerance of procedure:  Tolerated well, no immediate complications               ED Course                                           MDM  Number of Diagnoses or Management Options  Foreign body of skin of hand, left, initial encounter  Diagnosis management comments: Patient had foreign body removal as noted above.  He was given a tetanus booster.    Diagnosis and treatment plan discussed with patient.  Patient agreeable to plan.   I discussed findings with patient who voices understanding of discharge instructions, signs and symptoms requiring return to ED; discharged improved and in stable condition with follow up for re-evaluation.  Prescription for Keflex.    Patient Progress  Patient progress: stable      Final diagnoses:   Foreign body of skin of hand, left, initial encounter       ED Disposition  ED Disposition     ED Disposition Condition Comment    Discharge Stable           Lyell, Reggie Duane, MD  [address]   [address]  [place] [address]  [phone]      As needed    [facility] [address]  [facility] [address]  [phone]    As needed         Medication List      New Prescriptions    cephalexin 500 MG capsule  Commonly known as: KEFLEX  Take 1 capsule by mouth 3 (Three) Times a Day for 7 days.           Where to Get Your Medications      These medications were sent to [id number] - [place], [place] - [address] [place] [address] AT [facility] OF  &  - [phone]  - [phone] [address] [address]    Phone: [phone]   · cephalexin 500 MG capsule          Maureen Cai, AGUILAR  03/21/21 7210

## 2021-03-21 NOTE — DISCHARGE INSTRUCTIONS
Take antibiotics as prescribed.  Keep wound clean dry and covered.  Follow-up with hand specialist as needed.  Return for new or worsening symptoms.

## 2021-05-06 ENCOUNTER — HOSPITAL ENCOUNTER (EMERGENCY)
Facility: HOSPITAL | Age: 46
Discharge: HOME | End: 2021-05-06
Admitting: EMERGENCY MEDICINE

## 2021-05-06 VITALS
WEIGHT: 306 LBS | DIASTOLIC BLOOD PRESSURE: 99 MMHG | RESPIRATION RATE: 16 BRPM | OXYGEN SATURATION: 100 % | SYSTOLIC BLOOD PRESSURE: 155 MMHG | TEMPERATURE: 98 F | HEART RATE: 110 BPM | HEIGHT: 70 IN | BODY MASS INDEX: 43.81 KG/M2

## 2021-05-06 DIAGNOSIS — S76.011A HIP STRAIN, RIGHT, INITIAL ENCOUNTER: Primary | ICD-10-CM

## 2021-05-06 PROCEDURE — 96372 THER/PROPH/DIAG INJ SC/IM: CPT

## 2021-05-06 PROCEDURE — 99283 EMERGENCY DEPT VISIT LOW MDM: CPT

## 2021-05-06 PROCEDURE — 25010000002 KETOROLAC TROMETHAMINE PER 15 MG: Performed by: NURSE PRACTITIONER

## 2021-05-06 RX ORDER — KETOROLAC TROMETHAMINE 30 MG/ML
30 INJECTION, SOLUTION INTRAMUSCULAR; INTRAVENOUS ONCE
Status: COMPLETED | OUTPATIENT
Start: 2021-05-06 | End: 2021-05-06

## 2021-05-06 RX ORDER — METHYLPREDNISOLONE 4 MG/1
TABLET ORAL
Qty: 21 TABLET | Refills: 0 | OUTPATIENT
Start: 2021-05-06 | End: 2022-05-06

## 2021-05-06 RX ORDER — CYCLOBENZAPRINE HCL 10 MG
10 TABLET ORAL 3 TIMES DAILY PRN
Qty: 15 TABLET | Refills: 0 | OUTPATIENT
Start: 2021-05-06 | End: 2022-05-06

## 2021-05-06 RX ORDER — MELOXICAM 15 MG/1
15 TABLET ORAL DAILY
Qty: 30 TABLET | Refills: 0 | OUTPATIENT
Start: 2021-05-06 | End: 2022-05-06

## 2021-05-06 RX ORDER — HYDROCODONE BITARTRATE AND ACETAMINOPHEN 5; 325 MG/1; MG/1
1 TABLET ORAL ONCE AS NEEDED
Status: COMPLETED | OUTPATIENT
Start: 2021-05-06 | End: 2021-05-06

## 2021-05-06 RX ADMIN — HYDROCODONE BITARTRATE AND ACETAMINOPHEN 1 TABLET: 5; 325 TABLET ORAL at 09:43

## 2021-05-06 RX ADMIN — KETOROLAC TROMETHAMINE 30 MG: 30 INJECTION, SOLUTION INTRAMUSCULAR; INTRAVENOUS at 09:43

## 2022-04-18 ENCOUNTER — HOSPITAL ENCOUNTER (EMERGENCY)
Facility: HOSPITAL | Age: 47
Discharge: HOME | End: 2022-04-18
Attending: EMERGENCY MEDICINE | Admitting: EMERGENCY MEDICINE

## 2022-04-18 VITALS
SYSTOLIC BLOOD PRESSURE: 139 MMHG | HEART RATE: 88 BPM | HEIGHT: 70 IN | TEMPERATURE: 98 F | BODY MASS INDEX: 45.1 KG/M2 | WEIGHT: 315 LBS | RESPIRATION RATE: 18 BRPM | OXYGEN SATURATION: 98 % | DIASTOLIC BLOOD PRESSURE: 80 MMHG

## 2022-04-18 DIAGNOSIS — L03.311 CELLULITIS OF ABDOMINAL WALL: ICD-10-CM

## 2022-04-18 DIAGNOSIS — L02.91 ABSCESS: Primary | ICD-10-CM

## 2022-04-18 LAB
ANION GAP SERPL CALCULATED.3IONS-SCNC: 12 MMOL/L (ref 5–15)
BASOPHILS # BLD AUTO: 0 10*3/MM3 (ref 0–0.2)
BASOPHILS NFR BLD AUTO: 0.2 % (ref 0–1.5)
BUN SERPL-MCNC: 18 MG/DL (ref 6–20)
BUN/CREAT SERPL: 19.4 (ref 7–25)
CALCIUM SPEC-SCNC: 9.1 MG/DL (ref 8.6–10.5)
CHLORIDE SERPL-SCNC: 101 MMOL/L (ref 98–107)
CO2 SERPL-SCNC: 23 MMOL/L (ref 22–29)
CREAT SERPL-MCNC: 0.93 MG/DL (ref 0.76–1.27)
DEPRECATED RDW RBC AUTO: 46.8 FL (ref 37–54)
EGFRCR SERPLBLD CKD-EPI 2021: 101.9 ML/MIN/1.73
EOSINOPHIL # BLD AUTO: 0.3 10*3/MM3 (ref 0–0.4)
EOSINOPHIL NFR BLD AUTO: 2.9 % (ref 0.3–6.2)
ERYTHROCYTE [DISTWIDTH] IN BLOOD BY AUTOMATED COUNT: 13.5 % (ref 12.3–15.4)
GLUCOSE SERPL-MCNC: 112 MG/DL (ref 65–99)
HCT VFR BLD AUTO: 44.1 % (ref 37.5–51)
HGB BLD-MCNC: 15.3 G/DL (ref 13–17.7)
LYMPHOCYTES # BLD AUTO: 1.4 10*3/MM3 (ref 0.7–3.1)
LYMPHOCYTES NFR BLD AUTO: 13.5 % (ref 19.6–45.3)
MCH RBC QN AUTO: 34.6 PG (ref 26.6–33)
MCHC RBC AUTO-ENTMCNC: 34.7 G/DL (ref 31.5–35.7)
MCV RBC AUTO: 99.7 FL (ref 79–97)
MONOCYTES # BLD AUTO: 0.9 10*3/MM3 (ref 0.1–0.9)
MONOCYTES NFR BLD AUTO: 8.9 % (ref 5–12)
NEUTROPHILS NFR BLD AUTO: 7.8 10*3/MM3 (ref 1.7–7)
NEUTROPHILS NFR BLD AUTO: 74.5 % (ref 42.7–76)
NRBC BLD AUTO-RTO: 0 /100 WBC (ref 0–0.2)
PLATELET # BLD AUTO: 328 10*3/MM3 (ref 140–450)
PMV BLD AUTO: 7 FL (ref 6–12)
POTASSIUM SERPL-SCNC: 4.2 MMOL/L (ref 3.5–5.2)
RBC # BLD AUTO: 4.42 10*6/MM3 (ref 4.14–5.8)
SODIUM SERPL-SCNC: 136 MMOL/L (ref 136–145)
WBC NRBC COR # BLD: 10.5 10*3/MM3 (ref 3.4–10.8)

## 2022-04-18 PROCEDURE — 96365 THER/PROPH/DIAG IV INF INIT: CPT

## 2022-04-18 PROCEDURE — 80048 BASIC METABOLIC PNL TOTAL CA: CPT | Performed by: EMERGENCY MEDICINE

## 2022-04-18 PROCEDURE — 85025 COMPLETE CBC W/AUTO DIFF WBC: CPT | Performed by: EMERGENCY MEDICINE

## 2022-04-18 PROCEDURE — 25010000002 HYDROMORPHONE 1 MG/ML SOLUTION: Performed by: EMERGENCY MEDICINE

## 2022-04-18 PROCEDURE — 96375 TX/PRO/DX INJ NEW DRUG ADDON: CPT

## 2022-04-18 PROCEDURE — 25010000002 ONDANSETRON PER 1 MG: Performed by: EMERGENCY MEDICINE

## 2022-04-18 PROCEDURE — 99283 EMERGENCY DEPT VISIT LOW MDM: CPT

## 2022-04-18 PROCEDURE — 25010000002 CEFTRIAXONE PER 250 MG: Performed by: EMERGENCY MEDICINE

## 2022-04-18 RX ORDER — SODIUM CHLORIDE 0.9 % (FLUSH) 0.9 %
10 SYRINGE (ML) INJECTION AS NEEDED
Status: DISCONTINUED | OUTPATIENT
Start: 2022-04-18 | End: 2022-04-18 | Stop reason: HOSPADM

## 2022-04-18 RX ORDER — HYDROCODONE BITARTRATE AND ACETAMINOPHEN 5; 325 MG/1; MG/1
1 TABLET ORAL EVERY 6 HOURS PRN
Qty: 10 TABLET | Refills: 0 | OUTPATIENT
Start: 2022-04-18 | End: 2022-05-06

## 2022-04-18 RX ORDER — SULFAMETHOXAZOLE AND TRIMETHOPRIM 800; 160 MG/1; MG/1
2 TABLET ORAL 2 TIMES DAILY
Qty: 28 TABLET | Refills: 0 | OUTPATIENT
Start: 2022-04-18 | End: 2022-05-06

## 2022-04-18 RX ORDER — ONDANSETRON 2 MG/ML
4 INJECTION INTRAMUSCULAR; INTRAVENOUS ONCE
Status: COMPLETED | OUTPATIENT
Start: 2022-04-18 | End: 2022-04-18

## 2022-04-18 RX ADMIN — ONDANSETRON 4 MG: 2 INJECTION INTRAMUSCULAR; INTRAVENOUS at 06:11

## 2022-04-18 RX ADMIN — CEFTRIAXONE 1 G: 10 INJECTION, POWDER, FOR SOLUTION INTRAVENOUS at 07:09

## 2022-04-18 RX ADMIN — HYDROMORPHONE HYDROCHLORIDE 0.5 MG: 1 INJECTION, SOLUTION INTRAMUSCULAR; INTRAVENOUS; SUBCUTANEOUS at 06:11

## 2022-04-18 RX ADMIN — DOXYCYCLINE 100 MG: 100 INJECTION, POWDER, LYOPHILIZED, FOR SOLUTION INTRAVENOUS at 07:09

## 2022-04-18 NOTE — ED PROVIDER NOTES
"Subjective   Chief complaint: Patient is a pleasant 46-year-old.  He was hunting mushrooms 2 days ago.  He felt some discomfort in his right lower quadrant of his abdomen.  He was walking through burs and thought maybe it was a bur or a bug bite.  He went to an urgent care and they used some tweezers and \"got something out\".  They put him on antibiotics.  He has become red and swollen to his lower abdominal wall since then.  With worsening pain he presented here to the hospital for evaluation.    Context: As above.  His last tetanus was 1 year ago.    Duration: Progressive over 2 days    Timing: Progressive    Severity: Pain is severe    Associated Symptoms: No vomiting.  No fever.  No diarrhea.  His pain is localized on his abdominal wall where it is red and swollen.  There is no overt abdominal pain or chest pain.        PCP:            Review of Systems   Constitutional: Negative for fever.   Cardiovascular: Negative for chest pain.   Gastrointestinal: Positive for abdominal pain.   Genitourinary: Negative.    Musculoskeletal: Negative.    Skin: Positive for rash and wound.   Neurological: Negative for headaches.       Past Medical History:   Diagnosis Date   • Anxiety    • Cervical disc disorder    • DDD (degenerative disc disease), lumbar    • Depression    • Indigestion     occ       Allergies   Allergen Reactions   • Contrast Dye Rash       Past Surgical History:   Procedure Laterality Date   • ANTERIOR CERVICAL DISCECTOMY W/ FUSION Left 7/13/2018    Procedure: CERVICAL DISCECTOMY ANTERIOR WITH ATLANTIS cervical 456, possible c5 corpectomy with anterior fixation from c4-6, use of allograft;  Surgeon: Larry Crowder IV, MD;  Location: [facility];  Service: Neurosurgery   • BACK SURGERY       Dr. Keller 2010 & Dr. Corral 2014   • KNEE SURGERY Right 2013   • LAPAROSCOPIC CHOLECYSTECTOMY     • NECK SURGERY  07/13/2018       Family History   Problem Relation Age of Onset   • No Known Problems Mother    • " Malig Hyperthermia Neg Hx        Social History     Socioeconomic History   • Marital status:    Tobacco Use   • Smoking status: Current Some Day Smoker     Types: Cigarettes     Last attempt to quit: 2015     Years since quittin.2   • Smokeless tobacco: Never Used   • Tobacco comment: aprox 10 cigarettes per week   Vaping Use   • Vaping Use: Never used   Substance and Sexual Activity   • Alcohol use: Yes     Comment: occ   • Drug use: No   • Sexual activity: Defer           Objective   Physical Exam  Vitals and nursing note reviewed.   Constitutional:       Appearance: Normal appearance.   HENT:      Head: Normocephalic.   Abdominal:          Comments: Area of 2.5 centimeter fluctuant with black center.  Surrounding cellulitis of approximately 4 cm circumferential.   Neurological:      Mental Status: He is alert.         Incision & Drainage    Date/Time: 2022 6:45 AM  Performed by: Michael Woods DO  Authorized by: Michael Woods DO     Consent:     Consent obtained:  Verbal    Consent given by:  Patient    Risks discussed:  Bleeding, incomplete drainage, infection, damage to other organs and pain    Alternatives discussed:  No treatment  Universal protocol:     Procedure explained and questions answered to patient or proxy's satisfaction: yes      Patient identity confirmed:  Verbally with patient  Location:     Type:  Abscess    Size:  2.5    Location:  Trunk    Trunk location:  Abdomen  Pre-procedure details:     Skin preparation:  Povidone-iodine  Sedation:     Sedation type: hydromorphone.  Anesthesia:     Anesthesia method:  Local infiltration    Local anesthetic:  Lidocaine 2% w/o epi  Procedure type:     Complexity:  Simple  Procedure details:     Incision types:  Stab incision    Incision depth:  Dermal    Wound management:  Probed and deloculated    Drainage:  Purulent and bloody    Drainage amount:  Moderate    Wound treatment:  Drain placed    Packing materials:   in  "iodoform gauze    Amount 1/4\" iodoform:  2  Post-procedure details:     Procedure completion:  Tolerated well, no immediate complications               ED Course  ED Course as of 04/18/22 0725   Mon Apr 18, 2022   0725 Inspect report run.  It will not provide report. [LH]      ED Course User Index  [LH] Michael Woods Scott,       Results for orders placed or performed during the hospital encounter of 04/18/22   Basic Metabolic Panel    Specimen: Blood   Result Value Ref Range    Glucose 112 (H) 65 - 99 mg/dL    BUN 18 6 - 20 mg/dL    Creatinine 0.93 0.76 - 1.27 mg/dL    Sodium 136 136 - 145 mmol/L    Potassium 4.2 3.5 - 5.2 mmol/L    Chloride 101 98 - 107 mmol/L    CO2 23.0 22.0 - 29.0 mmol/L    Calcium 9.1 8.6 - 10.5 mg/dL    BUN/Creatinine Ratio 19.4 7.0 - 25.0    Anion Gap 12.0 5.0 - 15.0 mmol/L    eGFR 101.9 >60.0 mL/min/1.73   CBC Auto Differential    Specimen: Blood   Result Value Ref Range    WBC 10.50 3.40 - 10.80 10*3/mm3    RBC 4.42 4.14 - 5.80 10*6/mm3    Hemoglobin 15.3 13.0 - 17.7 g/dL    Hematocrit 44.1 37.5 - 51.0 %    MCV 99.7 (H) 79.0 - 97.0 fL    MCH 34.6 (H) 26.6 - 33.0 pg    MCHC 34.7 31.5 - 35.7 g/dL    RDW 13.5 12.3 - 15.4 %    RDW-SD 46.8 37.0 - 54.0 fl    MPV 7.0 6.0 - 12.0 fL    Platelets 328 140 - 450 10*3/mm3    Neutrophil % 74.5 42.7 - 76.0 %    Lymphocyte % 13.5 (L) 19.6 - 45.3 %    Monocyte % 8.9 5.0 - 12.0 %    Eosinophil % 2.9 0.3 - 6.2 %    Basophil % 0.2 0.0 - 1.5 %    Neutrophils, Absolute 7.80 (H) 1.70 - 7.00 10*3/mm3    Lymphocytes, Absolute 1.40 0.70 - 3.10 10*3/mm3    Monocytes, Absolute 0.90 0.10 - 0.90 10*3/mm3    Eosinophils, Absolute 0.30 0.00 - 0.40 10*3/mm3    Basophils, Absolute 0.00 0.00 - 0.20 10*3/mm3    nRBC 0.0 0.0 - 0.2 /100 WBC                                                MDM  Number of Diagnoses or Management Options  Diagnosis management comments: Patient did have I&D obtained here.  He had significant mount of drainage.  Packing was placed.  His tetanus " is up-to-date.  I discussed packing removal in 2 days.  He verbalizes understanding is okay with this.  At this point time he was obtaining IV antibiotics and will discharge him to follow-up outpatient.  He verbalized understanding is okay with this.       Amount and/or Complexity of Data Reviewed  Clinical lab tests: reviewed  Independent visualization of images, tracings, or specimens: yes    Patient Progress  Patient progress: improved      Final diagnoses:   None   Right lower abdominal wall abscess/cellulitis status post I&D    ED Disposition  ED Disposition     None          No follow-up provider specified.       Medication List      No changes were made to your prescriptions during this visit.          Michael Woods,   04/18/22 0706       Michael Woods,   04/18/22 0725

## 2022-04-18 NOTE — ED NOTES
Patient was mushroom hunting on Friday and said he does not know whether its a bug bite or a splinter.  Patient states that he is having generalized weakness along with a little nausea

## 2022-04-19 PROCEDURE — 87205 SMEAR GRAM STAIN: CPT | Performed by: FAMILY MEDICINE

## 2022-04-19 PROCEDURE — 87070 CULTURE OTHR SPECIMN AEROBIC: CPT | Performed by: FAMILY MEDICINE

## 2022-04-19 PROCEDURE — 87186 SC STD MICRODIL/AGAR DIL: CPT | Performed by: FAMILY MEDICINE

## 2022-04-19 PROCEDURE — 87147 CULTURE TYPE IMMUNOLOGIC: CPT | Performed by: FAMILY MEDICINE

## 2022-04-21 DIAGNOSIS — Z22.322 MRSA (METHICILLIN RESISTANT STAPH AUREUS) CULTURE POSITIVE: Primary | ICD-10-CM

## 2022-04-21 RX ORDER — CLINDAMYCIN HYDROCHLORIDE 300 MG/1
300 CAPSULE ORAL 3 TIMES DAILY
Qty: 30 CAPSULE | Refills: 0 | OUTPATIENT
Start: 2022-04-21 | End: 2022-05-06

## 2022-11-25 ENCOUNTER — TELEPHONE (OUTPATIENT)
Dept: URGENT CARE | Facility: CLINIC | Age: 47
End: 2022-11-25

## 2022-11-25 DIAGNOSIS — J01.90 ACUTE NON-RECURRENT SINUSITIS, UNSPECIFIED LOCATION: Primary | ICD-10-CM

## 2022-11-25 RX ORDER — AZITHROMYCIN 250 MG/1
250 TABLET, FILM COATED ORAL DAILY
Qty: 6 TABLET | Refills: 0 | Status: SHIPPED | OUTPATIENT
Start: 2022-11-25

## 2024-04-20 ENCOUNTER — TELEPHONE (OUTPATIENT)
Dept: URGENT CARE | Facility: CLINIC | Age: 49
End: 2024-04-20
Payer: COMMERCIAL

## 2024-04-20 DIAGNOSIS — J01.00 ACUTE MAXILLARY SINUSITIS, RECURRENCE NOT SPECIFIED: Primary | ICD-10-CM

## 2024-04-20 RX ORDER — AMOXICILLIN AND CLAVULANATE POTASSIUM 875; 125 MG/1; MG/1
1 TABLET, FILM COATED ORAL 2 TIMES DAILY
Qty: 10 TABLET | Refills: 0 | Status: SHIPPED | OUTPATIENT
Start: 2024-04-20 | End: 2024-04-25

## 2024-04-20 NOTE — TELEPHONE ENCOUNTER
Patient states he was seen on Wednesday and given Stahist, Beverley had told him to call back on Saturday if he wasn't any better and she would call something else in. He says he is not feeling any better and still has a runny nose. Uses [facility] [employer].

## 2024-07-24 ENCOUNTER — LAB REQUISITION (OUTPATIENT)
Dept: LAB | Facility: HOSPITAL | Age: 49
End: 2024-07-24
Payer: COMMERCIAL

## 2024-07-24 DIAGNOSIS — M79.671 PAIN IN RIGHT FOOT: ICD-10-CM

## 2024-07-24 DIAGNOSIS — G57.61 LESION OF PLANTAR NERVE, RIGHT LOWER LIMB: ICD-10-CM

## 2024-07-24 PROCEDURE — 88304 TISSUE EXAM BY PATHOLOGIST: CPT | Performed by: PODIATRIST

## 2024-07-25 LAB
LAB AP CASE REPORT: NORMAL
PATH REPORT.FINAL DX SPEC: NORMAL
PATH REPORT.GROSS SPEC: NORMAL

## 2024-11-15 PROBLEM — H60.332 ACUTE SWIMMER'S EAR OF LEFT SIDE: Status: RESOLVED | Noted: 2020-01-20 | Resolved: 2024-11-15

## 2024-11-15 PROBLEM — R29.898 RIGHT ARM WEAKNESS: Status: RESOLVED | Noted: 2018-07-26 | Resolved: 2024-11-15

## 2024-11-15 PROBLEM — Z98.890 POSTOPERATIVE STATE: Status: RESOLVED | Noted: 2018-07-26 | Resolved: 2024-11-15

## 2024-11-19 ENCOUNTER — LAB (OUTPATIENT)
Dept: FAMILY MEDICINE CLINIC | Facility: CLINIC | Age: 49
End: 2024-11-19
Payer: COMMERCIAL

## 2024-11-19 ENCOUNTER — OFFICE VISIT (OUTPATIENT)
Dept: FAMILY MEDICINE CLINIC | Facility: CLINIC | Age: 49
End: 2024-11-19
Payer: COMMERCIAL

## 2024-11-19 VITALS
HEART RATE: 89 BPM | DIASTOLIC BLOOD PRESSURE: 112 MMHG | HEIGHT: 70 IN | SYSTOLIC BLOOD PRESSURE: 158 MMHG | OXYGEN SATURATION: 95 % | WEIGHT: 315 LBS | BODY MASS INDEX: 45.1 KG/M2

## 2024-11-19 DIAGNOSIS — Z13.220 SCREENING FOR HYPERLIPIDEMIA: ICD-10-CM

## 2024-11-19 DIAGNOSIS — K21.9 GASTROESOPHAGEAL REFLUX DISEASE, UNSPECIFIED WHETHER ESOPHAGITIS PRESENT: ICD-10-CM

## 2024-11-19 DIAGNOSIS — Z00.00 WELLNESS EXAMINATION: Primary | ICD-10-CM

## 2024-11-19 DIAGNOSIS — Z11.59 NEED FOR HEPATITIS C SCREENING TEST: ICD-10-CM

## 2024-11-19 DIAGNOSIS — R09.89 CHEST CONGESTION: ICD-10-CM

## 2024-11-19 DIAGNOSIS — Z12.11 SCREEN FOR COLON CANCER: ICD-10-CM

## 2024-11-19 DIAGNOSIS — I10 ESSENTIAL HYPERTENSION: ICD-10-CM

## 2024-11-19 DIAGNOSIS — Z13.1 SCREENING FOR DIABETES MELLITUS: ICD-10-CM

## 2024-11-19 PROBLEM — N17.9 ACUTE RENAL FAILURE: Status: RESOLVED | Noted: 2019-12-30 | Resolved: 2024-11-19

## 2024-11-19 PROBLEM — F41.9 ANXIETY AND DEPRESSION: Status: RESOLVED | Noted: 2019-12-30 | Resolved: 2024-11-19

## 2024-11-19 PROBLEM — R03.0 ELEVATED BLOOD PRESSURE READING WITHOUT DIAGNOSIS OF HYPERTENSION: Status: RESOLVED | Noted: 2018-01-08 | Resolved: 2024-11-19

## 2024-11-19 PROBLEM — M54.12 CERVICAL RADICULOPATHY: Status: RESOLVED | Noted: 2018-07-06 | Resolved: 2024-11-19

## 2024-11-19 PROBLEM — E66.01 OBESITY, MORBID, BMI 50 OR HIGHER: Status: ACTIVE | Noted: 2024-11-19

## 2024-11-19 PROBLEM — F32.A ANXIETY AND DEPRESSION: Status: RESOLVED | Noted: 2019-12-30 | Resolved: 2024-11-19

## 2024-11-19 PROBLEM — M47.12 CERVICAL SPONDYLOSIS WITH MYELOPATHY: Status: RESOLVED | Noted: 2018-07-26 | Resolved: 2024-11-19

## 2024-11-19 LAB
ALBUMIN UR-MCNC: 1.5 MG/DL
ANION GAP SERPL CALCULATED.3IONS-SCNC: 9 MMOL/L (ref 5–15)
BUN SERPL-MCNC: 16 MG/DL (ref 6–20)
BUN/CREAT SERPL: 16 (ref 7–25)
CALCIUM SPEC-SCNC: 9.6 MG/DL (ref 8.6–10.5)
CHLORIDE SERPL-SCNC: 103 MMOL/L (ref 98–107)
CHOLEST SERPL-MCNC: 292 MG/DL (ref 0–200)
CO2 SERPL-SCNC: 26 MMOL/L (ref 22–29)
CREAT SERPL-MCNC: 1 MG/DL (ref 0.76–1.27)
CREAT UR-MCNC: 388.7 MG/DL
EGFRCR SERPLBLD CKD-EPI 2021: 91.7 ML/MIN/1.73
GLUCOSE SERPL-MCNC: 91 MG/DL (ref 65–99)
HBA1C MFR BLD: 5.9 % (ref 4.8–5.6)
HCV AB SER QL: NORMAL
HDLC SERPL-MCNC: 45 MG/DL (ref 40–60)
LDLC SERPL CALC-MCNC: 186 MG/DL (ref 0–100)
LDLC/HDLC SERPL: 4.11 {RATIO}
MICROALBUMIN/CREAT UR: 3.9 MG/G (ref 0–29)
POTASSIUM SERPL-SCNC: 4.6 MMOL/L (ref 3.5–5.2)
SODIUM SERPL-SCNC: 138 MMOL/L (ref 136–145)
TRIGL SERPL-MCNC: 311 MG/DL (ref 0–150)
VLDLC SERPL-MCNC: 61 MG/DL (ref 5–40)

## 2024-11-19 PROCEDURE — 99214 OFFICE O/P EST MOD 30 MIN: CPT | Performed by: STUDENT IN AN ORGANIZED HEALTH CARE EDUCATION/TRAINING PROGRAM

## 2024-11-19 PROCEDURE — 86803 HEPATITIS C AB TEST: CPT | Performed by: STUDENT IN AN ORGANIZED HEALTH CARE EDUCATION/TRAINING PROGRAM

## 2024-11-19 PROCEDURE — 36415 COLL VENOUS BLD VENIPUNCTURE: CPT | Performed by: STUDENT IN AN ORGANIZED HEALTH CARE EDUCATION/TRAINING PROGRAM

## 2024-11-19 PROCEDURE — 99396 PREV VISIT EST AGE 40-64: CPT | Performed by: STUDENT IN AN ORGANIZED HEALTH CARE EDUCATION/TRAINING PROGRAM

## 2024-11-19 PROCEDURE — 80048 BASIC METABOLIC PNL TOTAL CA: CPT | Performed by: STUDENT IN AN ORGANIZED HEALTH CARE EDUCATION/TRAINING PROGRAM

## 2024-11-19 PROCEDURE — 82043 UR ALBUMIN QUANTITATIVE: CPT | Performed by: STUDENT IN AN ORGANIZED HEALTH CARE EDUCATION/TRAINING PROGRAM

## 2024-11-19 PROCEDURE — 82570 ASSAY OF URINE CREATININE: CPT | Performed by: STUDENT IN AN ORGANIZED HEALTH CARE EDUCATION/TRAINING PROGRAM

## 2024-11-19 PROCEDURE — 80061 LIPID PANEL: CPT | Performed by: STUDENT IN AN ORGANIZED HEALTH CARE EDUCATION/TRAINING PROGRAM

## 2024-11-19 PROCEDURE — 83036 HEMOGLOBIN GLYCOSYLATED A1C: CPT | Performed by: STUDENT IN AN ORGANIZED HEALTH CARE EDUCATION/TRAINING PROGRAM

## 2024-11-19 RX ORDER — LOSARTAN POTASSIUM AND HYDROCHLOROTHIAZIDE 12.5; 5 MG/1; MG/1
1 TABLET ORAL DAILY
Qty: 90 TABLET | Refills: 0 | Status: SHIPPED | OUTPATIENT
Start: 2024-11-19

## 2024-11-19 NOTE — PROGRESS NOTES
"Chief Complaint  Chief Complaint   Patient presents with    Establish Care    Annual Exam    Cough     Since Sunday, taking mucinex , cough better, congestion lingers       Subjective        Brad A Schoen is a 49 y.o. male who presents to [facility].  History of Present Illness    Chu is a 49-year-old male here for wellness and a few other concerns.      Wellness  Diet: fast food pretty often, doesn't eat much during the day  Exercise: walks 9+ miles/day at work, woodcutting on the weekends  Smoking: Former smoker (2023, 10 cigarettes/week x 32 years)  ETOH: Occasional  Drug use: Denies        Hypertension  States it has been elevated several times over the last few years at urgent care visits  Never been on medication      Chest congestion  Had a cough last week which has significantly improved, however he continues to have chest congestion  Denies any fevers or shortness of breath  Taking Mucine          Objective   BP (!) 158/112   Pulse 89   Ht 177.8 cm (70\")   Wt (!) 162 kg (357 lb 3.2 oz)   SpO2 95%   BMI 51.25 kg/m²     Estimated body mass index is 51.25 kg/m² as calculated from the following:    Height as of this encounter: 177.8 cm (70\").    Weight as of this encounter: 162 kg (357 lb 3.2 oz).     Physical Exam   GEN: In no acute distress, non toxic appearing  HEENT: EOMI. Mucous membranes moist. Oropharynx without erythema or exudate.   CV: Regular rate and rhythm, no murmurs. No extremity edema.   RESP: Lungs clear to auscultation bilaterally.  No signs of respiratory distress on room air.  SKIN: No rashes  MSK: No joint erythema, deformity, or effusion.   NEURO: AAO to person, place, and time. CN 2-12 intact grossly.   PSYCH: Affect normal, insight fair     PHQ-2 Depression Screening  Little interest or pleasure in doing things? Not at all   Feeling down, depressed, or hopeless? Not at all   PHQ-2 Total Score 0         Result Review :              Assessment and " Plan     Diagnoses and all orders for this visit:    1. Wellness examination (Primary)    Encourage healthy lifestyle choices such as healthy diet choices (low carbohydrates, increase fruits/vegetables, less processed foods, limiting portion sizes) as well as increasing physical activity with goal of 150 minutes of moderate intensity exercise per week.    Routine screening labs ordered.    Immunizations  - Influenza: Due, recommended  - Shingles: Due, recommended    Screening  - CRC: Discussed pros/cons of options, patient would like to proceed with Cologuard        2. Essential hypertension  Several previously documented episodes of hypertension  Obtain labs as below and start losartan-hydrochlorothiazide 50 mg - 12.5 mg daily  Advised to monitor BP at home          -     Basic Metabolic Panel  -     Microalbumin / Creatinine Urine Ratio - Urine, Clean Catch    3. Gastroesophageal reflux disease  Well-controlled with Prilosec    4. Chest congestion  Lung exam is unremarkable  Continue Mucinex, can try humidifier    5. Screening for diabetes mellitus  -     Basic Metabolic Panel  -     Hemoglobin A1c    6. Screening for hyperlipidemia  -     Lipid Panel    7. Need for hepatitis C screening test  -     Hepatitis C Antibody            Follow Up     Return for Recheck 4-6 weeks.

## 2024-11-20 ENCOUNTER — TELEPHONE (OUTPATIENT)
Dept: FAMILY MEDICINE CLINIC | Facility: CLINIC | Age: 49
End: 2024-11-20
Payer: COMMERCIAL

## 2024-11-20 PROBLEM — E78.2 MIXED HYPERLIPIDEMIA: Status: ACTIVE | Noted: 2024-11-20

## 2024-11-20 PROBLEM — R73.03 PREDIABETES: Status: ACTIVE | Noted: 2024-11-20

## 2024-11-20 RX ORDER — ATORVASTATIN CALCIUM 20 MG/1
20 TABLET, FILM COATED ORAL DAILY
Qty: 90 TABLET | Refills: 3 | Status: SHIPPED | OUTPATIENT
Start: 2024-11-20

## 2024-11-20 NOTE — TELEPHONE ENCOUNTER
"HUB TO REALAY:  ----- Message from Jeremías Orlando sent at 11/20/2024  7:57 AM EST -----  Please call patient with lab results.  Labs show a few abnormalities worth pointing out-his cholesterol is significantly high to the point that he has a high risk of heart disease or stroke in the future.  Would recommend he start a cholesterol medication-prescription for atorvastatin 20 mg once daily has been sent to his pharmacy.  The other lab abnormality is slightly elevated blood sugars in the \"prediabetes\" range.  He does not need medication for this but something to keep an eye on to ensure he does not develop diabetes.      In addition to the medication as noted above would strongly recommend he work to improve his diet and exercise level as these can have a dramatic impact on his long-term health and hopefully reverse the above conditions.      On me know if he has any questions and I would be happy to address them.  "

## 2024-12-23 ENCOUNTER — OFFICE VISIT (OUTPATIENT)
Dept: FAMILY MEDICINE CLINIC | Facility: CLINIC | Age: 49
End: 2024-12-23
Payer: COMMERCIAL

## 2024-12-23 VITALS
HEIGHT: 70 IN | OXYGEN SATURATION: 96 % | DIASTOLIC BLOOD PRESSURE: 108 MMHG | BODY MASS INDEX: 45.1 KG/M2 | WEIGHT: 315 LBS | SYSTOLIC BLOOD PRESSURE: 142 MMHG | HEART RATE: 94 BPM

## 2024-12-23 DIAGNOSIS — E78.2 MIXED HYPERLIPIDEMIA: ICD-10-CM

## 2024-12-23 DIAGNOSIS — M79.645 PAIN OF LEFT THUMB: ICD-10-CM

## 2024-12-23 DIAGNOSIS — I10 ESSENTIAL HYPERTENSION: Primary | ICD-10-CM

## 2024-12-23 DIAGNOSIS — E66.01 OBESITY, MORBID, BMI 50 OR HIGHER: ICD-10-CM

## 2024-12-23 PROCEDURE — 90656 IIV3 VACC NO PRSV 0.5 ML IM: CPT | Performed by: STUDENT IN AN ORGANIZED HEALTH CARE EDUCATION/TRAINING PROGRAM

## 2024-12-23 PROCEDURE — 99214 OFFICE O/P EST MOD 30 MIN: CPT | Performed by: STUDENT IN AN ORGANIZED HEALTH CARE EDUCATION/TRAINING PROGRAM

## 2024-12-23 PROCEDURE — 90471 IMMUNIZATION ADMIN: CPT | Performed by: STUDENT IN AN ORGANIZED HEALTH CARE EDUCATION/TRAINING PROGRAM

## 2024-12-23 NOTE — PROGRESS NOTES
"Chief Complaint  Chief Complaint   Patient presents with    Hypertension     4 week follow up     Hyperlipidemia     4 week follow up     Hand Pain     Left thumb pain. Started 3 weeks ago.        Subjective        Brad A Schoen is a 49 y.o. male who presents to [facility].  History of Present Illness    Chu is a 49-year-old male here for chronic condition management.      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    Weight as of this encounter: 165 kg (363 lb 6.4 oz).     Physical Exam   GEN: In no acute distress, non toxic appearing  HEENT: MMM. EOMI.   CV: No extremity edema.   RESP: No signs of respiratory distress.  SKIN: No rashes  MSK: Pain over the left CMC joint, reproduced with thumb flexion.  Mild tenderness to palpation over the CMC joint.  +5/5 strength with thumb flexion/extension.  NEURO: Moves all extremities equally. Alert and appropriate            Result Review :              Assessment and Plan     Diagnoses and all orders for this visit:    1. Essential hypertension (Primary)  Blood pressure is above goal today at 142/108, however is slightly improved compared to previous measurement in office.  I suspect patient may need further " titration of medication, but will continue current dose at this time of losartan-hydrochlorothiazide.  Patient will monitor blood pressure at home.      2. Mixed hyperlipidemia  Doing well with atorvastatin 20 mg once daily  Plan to repeat lipid panel with next set of labs    3. Obesity, morbid, BMI 50 or higher    Discussed importance of lifestyle changes in order to achieve and sustain healthy BMI.  Discussed strategies in order to help make healthy lifestyle changes.  Discussed role that GLP-1 receptor agonist can have with weight loss.  Discussed common side effects.  Patient is interested in starting GLP-1 receptor agonist-will start with semaglutide 0.25 mg weekly and titrate monthly as tolerated.      4. Pain of left thumb  Given the location of pain most likely diagnosis is CMC arthritis.  Recommend conservative treatment with rest, NSAIDs as needed  Advised if symptoms persist over several weeks and he is needing frequent use of ibuprofen to call office.  At that point would recommend switching ibuprofen to meloxicam          Other orders  -     Semaglutide-Weight Management 0.25 MG/0.5ML solution auto-injector; Inject 0.5 mL under the skin into the appropriate area as directed 1 (One) Time Per Week.  Dispense: 2 mL; Refill: 0  -     Fluzone >6mos ([id number])            Follow Up     Return in about 3 months (around 3/23/2025) for Recheck.

## 2024-12-27 ENCOUNTER — TELEPHONE (OUTPATIENT)
Dept: FAMILY MEDICINE CLINIC | Facility: CLINIC | Age: 49
End: 2024-12-27
Payer: COMMERCIAL

## 2024-12-27 NOTE — TELEPHONE ENCOUNTER
Caller: Schoen, Brad A    Relationship to patient: Self      Best call back number: [phone]    Provider: DR RICHTER    Medication PA needed:     Semaglutide-Weight Management 0.25 MG/0.5ML solution auto-injector  0.25 mg, Weekly       Reason for call/Prior Auth:  INSURANCE IS REQUESTING

## 2024-12-30 NOTE — TELEPHONE ENCOUNTER
"    Caller: Schoen, Brad A    Relationship: Self    Best call back number: [phone]    Requested Prescriptions:   Requested Prescriptions     Pending Prescriptions Disp Refills    Semaglutide-Weight Management 0.25 MG/0.5ML solution auto-injector 2 mL 0     Sig: Inject 0.5 mL under the skin into the appropriate area as directed 1 (One) Time Per Week.        Pharmacy where request should be sent: [facility] \"COMPOUNDS ONLY\" - [facility] [place] - [address] [facility] [phone] [facility] [phone]      Last office visit with prescribing clinician: 12/23/2024   Last telemedicine visit with prescribing clinician: Visit date not found   Next office visit with prescribing clinician: 3/24/2025     Additional details provided by patient: PATIENT STATES INSURANCE DENIED THE :Semaglutide-Weight Management 0.25 MG/0.5ML solution auto-injector     HE IS WONDERING IF WE CAN SEND THIS TO [facility]         KARLOS Juarez   12/30/24 08:42 EST     "

## 2024-12-30 NOTE — TELEPHONE ENCOUNTER
This was sent and received  as below    E-Prescribing Status: Receipt confirmed by pharmacy (12/23/2024  9:02 AM EST)     This went to [facility]

## 2025-01-03 ENCOUNTER — HOSPITAL ENCOUNTER (EMERGENCY)
Facility: HOSPITAL | Age: 50
Discharge: HOME | End: 2025-01-03
Attending: EMERGENCY MEDICINE
Payer: COMMERCIAL

## 2025-01-03 ENCOUNTER — TELEPHONE (OUTPATIENT)
Dept: FAMILY MEDICINE CLINIC | Facility: CLINIC | Age: 50
End: 2025-01-03
Payer: COMMERCIAL

## 2025-01-03 VITALS
DIASTOLIC BLOOD PRESSURE: 113 MMHG | OXYGEN SATURATION: 98 % | HEART RATE: 94 BPM | TEMPERATURE: 98.4 F | RESPIRATION RATE: 18 BRPM | SYSTOLIC BLOOD PRESSURE: 156 MMHG

## 2025-01-03 DIAGNOSIS — H61.21 IMPACTED CERUMEN OF RIGHT EAR: Primary | ICD-10-CM

## 2025-01-03 PROCEDURE — 99282 EMERGENCY DEPT VISIT SF MDM: CPT

## 2025-01-03 PROCEDURE — 69210 REMOVE IMPACTED EAR WAX UNI: CPT

## 2025-01-03 NOTE — DISCHARGE INSTRUCTIONS
Return back to ER in 7 to 10 days if hearing is not improving.  At this point, we may consider a scan of the brain.  I would not worry too much about this.  Also return to hospital for headache, fever, stiff neck, confusion or any other concerns.

## 2025-01-03 NOTE — FSED PROVIDER NOTE
Subjective   History of Present Illness  This is a 49-year-old gentleman here for right ear fullness and ear clogging.  He is not sure if he may have wax in there.  This is causing him to be dizzy with position changes of his head.  Symptoms are constant and worsening for 4 days.  Symptoms are worsened with position changes and movement of his head.  He also reports some hearing loss.  He has tried peroxide.  He has tried over-the-counter earwax and swimmer ear removal medicine from [facility] which did not help.  He denies any fever, headache or stiff neck.  No cough or other symptoms.    History provided by:  Medical records and patient      Review of Systems   Constitutional: Negative.    HENT:  Positive for ear pain.    Eyes: Negative.    Respiratory: Negative.     Cardiovascular: Negative.    Gastrointestinal: Negative.    Endocrine: Negative.    Genitourinary: Negative.    Musculoskeletal: Negative.    Skin: Negative.    Allergic/Immunologic: Negative.    Neurological:  Positive for dizziness.       Past Medical History:   Diagnosis Date    Anxiety     Cervical disc disorder     DDD (degenerative disc disease), lumbar     Depression     Indigestion     occ       Allergies   Allergen Reactions    Contrast Dye (Echo Or Unknown Ct/Mr) Rash       Past Surgical History:   Procedure Laterality Date    ANTERIOR CERVICAL DISCECTOMY W/ FUSION Left 07/13/2018    Procedure: CERVICAL DISCECTOMY ANTERIOR WITH ATLANTIS cervical 456, possible c5 corpectomy with anterior fixation from c4-6, use of allograft;  Surgeon: Larry Crowder IV, MD;  Location: [facility];  Service: Neurosurgery    BACK SURGERY       Dr. Keller 2010 & Dr. Corral 2014    FOOT ARTHROPLASTY Right 01/2024    KNEE SURGERY Right 2013    LAPAROSCOPIC CHOLECYSTECTOMY      NECK SURGERY  07/13/2018       Family History   Problem Relation Age of Onset    No Known Problems Mother     Malig Hyperthermia Neg Hx        Social History     Socioeconomic History     Marital status:    Tobacco Use    Smoking status: Former     Current packs/day: 0.00     Average packs/day: 0.3 packs/day for 32.0 years (8.0 ttl pk-yrs)     Types: Cigarettes     Start date:      Quit date:      Years since quittin.0     Passive exposure: Past    Smokeless tobacco: Never    Tobacco comments:     aprox 10 cigarettes per week   Vaping Use    Vaping status: Never Used   Substance and Sexual Activity    Alcohol use: Yes     Comment: occ    Drug use: No    Sexual activity: Not Currently           Objective   Physical Exam  Vitals and nursing note reviewed.   Constitutional:       General: He is not in acute distress.     Appearance: Normal appearance. He is obese. He is not ill-appearing or toxic-appearing.   HENT:      Head: Normocephalic and atraumatic.      Right Ear: There is impacted cerumen.      Nose: Nose normal.      Mouth/Throat:      Mouth: Mucous membranes are moist.      Pharynx: Oropharynx is clear. No oropharyngeal exudate or posterior oropharyngeal erythema.   Eyes:      Extraocular Movements: Extraocular movements intact.      Conjunctiva/sclera: Conjunctivae normal.      Pupils: Pupils are equal, round, and reactive to light.   Cardiovascular:      Rate and Rhythm: Normal rate and regular rhythm.      Pulses: Normal pulses.      Heart sounds: Normal heart sounds. No murmur heard.  Pulmonary:      Effort: Pulmonary effort is normal. No respiratory distress.      Breath sounds: Normal breath sounds. No stridor. No wheezing, rhonchi or rales.   Abdominal:      General: Abdomen is flat. Bowel sounds are normal.      Palpations: Abdomen is soft.      Tenderness: There is no abdominal tenderness. There is no right CVA tenderness or left CVA tenderness.   Musculoskeletal:         General: Normal range of motion.      Cervical back: Normal range of motion. No rigidity or tenderness.   Skin:     General: Skin is warm and dry.      Capillary Refill: Capillary refill takes  less than 2 seconds.   Neurological:      General: No focal deficit present.      Mental Status: He is alert and oriented to person, place, and time. Mental status is at baseline.      Cranial Nerves: No cranial nerve deficit.      Sensory: No sensory deficit.      Motor: No weakness.      Coordination: Coordination normal.      Gait: Gait normal.   Psychiatric:         Mood and Affect: Mood normal.         Behavior: Behavior normal.         Thought Content: Thought content normal.         Judgment: Judgment normal.         Procedures           ED Course  ED Course as of 01/03/25 0732   Fri Jan 03, 2025   0730 Peroxide was instilled into the ear.  Small amount of earwax was removed with curette.  Ear was examined post procedure and no inflammation or swelling of the ear canal.  No pain with movement of pinna.  Hearing is still somewhat diminished. [PP]      ED Course User Index  [PP] Selwyn Sims MD                                           Medical Decision Making  Suspect impacted cerumen.  Will unclog using some peroxide.  Will use ear curette.  There is no concern for otitis media or otitis externa which was considered.  Independent review of vitals shows they are normal except for uncontrolled hypertension for which he was referred to PCP within 4 weeks.    Amount and/or Complexity of Data Reviewed  Independent Historian: parent    Risk  OTC drugs.  Prescription drug management.        Final diagnoses:   Impacted cerumen of right ear       ED Disposition  ED Disposition       ED Disposition   Discharge    Condition   Stable    Comment   --               Jeremías Orlando DO  [address]  [place] [address]  [phone]    Schedule an appointment as soon as possible for a visit in 2 days           Medication List        New Prescriptions      carbamide peroxide 6.5 % otic solution  Commonly known as: DEBROX  Administer 5 drops to the right ear 2 (Two) Times a Day for 4 days. 1  bttle               Where to Get Your Medications        These medications were sent to [id number] - [place], [place] - [address] AT SEC OF [address] - [phone]  - [phone] FX  [address] [place] [address]      Phone: [phone]   carbamide peroxide 6.5 % otic solution

## 2025-01-03 NOTE — TELEPHONE ENCOUNTER
Caller: Schoen, Brad A    Relationship: Self    Best call back number:    [phone] (Mobile       What was the call regarding: PATIENT WAS ASKING FOR AN UPDATE ON THE SEMAGLUTIDE COMPOUND VERSION     HAS THIS BEEN CALLED IN TO [place]?       Is it okay if the provider responds through MyChart: PLEASE ADVISE

## 2025-01-06 ENCOUNTER — HOSPITAL ENCOUNTER (EMERGENCY)
Facility: HOSPITAL | Age: 50
Discharge: HOME | End: 2025-01-06
Attending: EMERGENCY MEDICINE | Admitting: EMERGENCY MEDICINE
Payer: COMMERCIAL

## 2025-01-06 VITALS
TEMPERATURE: 98.8 F | WEIGHT: 315 LBS | RESPIRATION RATE: 20 BRPM | BODY MASS INDEX: 45.1 KG/M2 | DIASTOLIC BLOOD PRESSURE: 82 MMHG | HEIGHT: 70 IN | HEART RATE: 87 BPM | OXYGEN SATURATION: 96 % | SYSTOLIC BLOOD PRESSURE: 145 MMHG

## 2025-01-06 DIAGNOSIS — H65.01 RIGHT ACUTE SEROUS OTITIS MEDIA, RECURRENCE NOT SPECIFIED: Primary | ICD-10-CM

## 2025-01-06 PROCEDURE — 99282 EMERGENCY DEPT VISIT SF MDM: CPT

## 2025-01-06 RX ORDER — FLUTICASONE PROPIONATE 50 MCG
2 SPRAY, SUSPENSION (ML) NASAL DAILY
Qty: 16 G | Refills: 0 | Status: SHIPPED | OUTPATIENT
Start: 2025-01-06

## 2025-01-06 RX ORDER — AMOXICILLIN 875 MG/1
875 TABLET, COATED ORAL 2 TIMES DAILY
Qty: 14 TABLET | Refills: 0 | Status: SHIPPED | OUTPATIENT
Start: 2025-01-06 | End: 2025-01-13

## 2025-01-06 NOTE — ED PROVIDER NOTES
Subjective   History of Present Illness  49-year-old male presents for fullness in right ear.  Been going on approximately 11 days.  Ear feels like it is clogged and difficult to hear out of.  Been using over-the-counter drops.  Seen in urgent care and had ear cleaned out without relief he states.  No fevers.  States no pain on outside of ear.  Denies any rashes.  Review of Systems  See HPI.  Past Medical History:   Diagnosis Date    Anxiety     Cervical disc disorder     DDD (degenerative disc disease), lumbar     Depression     Indigestion     occ       Allergies   Allergen Reactions    Contrast Dye (Echo Or Unknown Ct/Mr) Rash       Past Surgical History:   Procedure Laterality Date    ANTERIOR CERVICAL DISCECTOMY W/ FUSION Left 2018    Procedure: CERVICAL DISCECTOMY ANTERIOR WITH ATLANTIS cervical 456, possible c5 corpectomy with anterior fixation from c4-6, use of allograft;  Surgeon: Larry Crowder IV, MD;  Location: [facility];  Service: Neurosurgery    BACK SURGERY       Dr. Keller  & Dr. Corral     FOOT ARTHROPLASTY Right 2024    KNEE SURGERY Right     LAPAROSCOPIC CHOLECYSTECTOMY      NECK SURGERY  2018       Family History   Problem Relation Age of Onset    No Known Problems Mother     Malig Hyperthermia Neg Hx        Social History     Socioeconomic History    Marital status:    Tobacco Use    Smoking status: Former     Current packs/day: 0.00     Average packs/day: 0.3 packs/day for 32.0 years (8.0 ttl pk-yrs)     Types: Cigarettes     Start date:      Quit date:      Years since quittin.0     Passive exposure: Past    Smokeless tobacco: Never    Tobacco comments:     aprox 10 cigarettes per week   Vaping Use    Vaping status: Never Used   Substance and Sexual Activity    Alcohol use: Yes     Comment: occ    Drug use: No    Sexual activity: Not Currently           Objective   Physical Exam  No acute distress, poor light reflex on right TM, mild  "erythema of canal without debris on right, no tenderness to palpation of tragus bilaterally, nonpurulent right ear effusion present.  Left TM and EAC unremarkable with good light reflex on TM.  No meningeal signs.  Cranial nerves II through XII intact.  No nuchal rigidity.  Cobblestoning of posterior oropharynx with erythematous oropharynx with midline uvula no mass effect no exudate  Procedures           ED Course      /82   Pulse 87   Temp 98.8 °F (37.1 °C)   Resp 20   Ht 177.8 cm (70\")   Wt (!) 166 kg (365 lb 15.4 oz)   SpO2 96%   BMI 52.51 kg/m²                                                    Medical Decision Making    Exam most consistent with otitis media.  Does endorse some postnasal drip symptoms as well.  Recommended Flonase.  Dizziness that patient had endorsed at urgent care previously has since resolved.  Will treat with course of antibiotics  Final diagnoses:   Right acute serous otitis media, recurrence not specified       ED Disposition  ED Disposition       ED Disposition   Discharge    Condition   Stable    Comment   --               Jeremías Orlando DO  [address]  [address]  [place] [facility]  [phone]    In 3 days           Medication List        New Prescriptions      amoxicillin 875 MG tablet  Commonly known as: AMOXIL  Take 1 tablet by mouth 2 (Two) Times a Day for 7 days.     fluticasone 50 MCG/ACT nasal spray  Commonly known as: FLONASE  Administer 2 sprays into the nostril(s) as directed by provider Daily.               Where to Get Your Medications        These medications were sent to [facility] [address] [place] [address]      Hours: Monday to Friday 7 AM to 7 PM Phone: [phone]   amoxicillin 875 MG tablet  fluticasone 50 MCG/ACT nasal spray            Karri Watson MD  01/06/25 5509    "

## 2025-01-13 ENCOUNTER — OFFICE VISIT (OUTPATIENT)
Dept: FAMILY MEDICINE CLINIC | Facility: CLINIC | Age: 50
End: 2025-01-13
Payer: COMMERCIAL

## 2025-01-13 VITALS
OXYGEN SATURATION: 97 % | HEIGHT: 70 IN | WEIGHT: 315 LBS | BODY MASS INDEX: 45.1 KG/M2 | HEART RATE: 97 BPM | DIASTOLIC BLOOD PRESSURE: 90 MMHG | SYSTOLIC BLOOD PRESSURE: 138 MMHG

## 2025-01-13 DIAGNOSIS — H65.91 OTITIS MEDIA WITH EFFUSION, RIGHT: Primary | ICD-10-CM

## 2025-01-13 PROCEDURE — 99214 OFFICE O/P EST MOD 30 MIN: CPT | Performed by: STUDENT IN AN ORGANIZED HEALTH CARE EDUCATION/TRAINING PROGRAM

## 2025-01-13 RX ORDER — AZELASTINE 1 MG/ML
2 SPRAY, METERED NASAL 2 TIMES DAILY
Qty: 30 ML | Refills: 1 | Status: SHIPPED | OUTPATIENT
Start: 2025-01-13

## 2025-01-13 NOTE — PROGRESS NOTES
"Chief Complaint  Chief Complaint   Patient presents with    Ear Fullness     Fells like it is full of mud, this is day 17 or so of it.        Subjective        Brad A Schoen is a 49 y.o. male who presents to [facility].  History of Present Illness    Chu is a 49 year old male here for ear pressure.      Ear pressure  Patient states ~18 days ago he woke up with significant pressure in his right ear  Since that time pressure has continued and sounds are muffled on that side  Denies any cough, congestion, runny nose  Was seen in the ED on 1/3 -at that time was diagnosed with cerumen impaction; ears were cleaned out with peroxide and curette and he was sent home with prescription for Debrox drops.  He was seen again in the ED on 1/6 for persistent symptoms.  At that time diagnosed with serous otitis media and prescribed Flonase and amoxicillin.  Patient states that he is finished the amoxicillin has been using the Flonase as prescribed.  States symptoms have not improved at all.  Denies history of similar symptoms in the past      Objective   /90   Pulse 97   Ht 177.8 cm (70\")   Wt (!) 164 kg (360 lb 12.8 oz)   SpO2 97%   BMI 51.77 kg/m²     Estimated body mass index is 51.77 kg/m² as calculated from the following:    Height as of this encounter: 177.8 cm (70\").    Weight as of this encounter: 164 kg (360 lb 12.8 oz).     Physical Exam   GEN: In no acute distress, non toxic appearing  HEENT: MMM. EOMI. Oropharynx without erythema or exudates.  Left TM is normal in appearance.  Right TM is mildly bulging with clear effusion visualized behind TM; no erythema appreciated.  CV: No extremity edema.   RESP: No signs of respiratory distress.  SKIN: No rashes  MSK: No deformity.   NEURO: Moves all extremities equally. Alert and appropriate         Result Review :              Assessment and Plan     Diagnoses and all orders for this visit:    1. Otitis media with effusion, right " (Primary)  Undiagnosed new problem with uncertain prognosis  Unilateral effusion with persistent pressure and muffled hearing for the last ~18 days.  No improvement with amoxicillin, Flonase.  Recommend to discontinue Debrox drops as there is no cerumen visualized in the ear canal.    Will replace Flonase with azelastine to see if this will offer any improvement in symptoms.  Will obtain CT to evaluate for underlying obstruction.  Will refer to ENT for further evaluation and management.      -     Ambulatory Referral to ENT (Otolaryngology)  -     CT Sinus Without Contrast; Future  -     azelastine (ASTELIN) 0.1 % nasal spray; Administer 2 sprays into the nostril(s) as directed by provider 2 (Two) Times a Day. Use in each nostril as directed  Dispense: 30 mL; Refill: 1            Follow Up           No follow-ups on file.

## 2025-02-14 RX ORDER — LOSARTAN POTASSIUM AND HYDROCHLOROTHIAZIDE 12.5; 5 MG/1; MG/1
1 TABLET ORAL DAILY
Qty: 90 TABLET | Refills: 0 | Status: SHIPPED | OUTPATIENT
Start: 2025-02-14

## 2025-02-24 RX ORDER — ATORVASTATIN CALCIUM 20 MG/1
20 TABLET, FILM COATED ORAL DAILY
Qty: 90 TABLET | Refills: 3 | Status: SHIPPED | OUTPATIENT
Start: 2025-02-24

## 2025-03-24 ENCOUNTER — OFFICE VISIT (OUTPATIENT)
Dept: FAMILY MEDICINE CLINIC | Facility: CLINIC | Age: 50
End: 2025-03-24
Payer: COMMERCIAL

## 2025-03-24 VITALS
OXYGEN SATURATION: 97 % | HEART RATE: 97 BPM | BODY MASS INDEX: 45.1 KG/M2 | DIASTOLIC BLOOD PRESSURE: 102 MMHG | WEIGHT: 315 LBS | HEIGHT: 70 IN | SYSTOLIC BLOOD PRESSURE: 136 MMHG

## 2025-03-24 DIAGNOSIS — I10 ESSENTIAL HYPERTENSION: Primary | ICD-10-CM

## 2025-03-24 DIAGNOSIS — L91.8 SKIN TAG: ICD-10-CM

## 2025-03-24 DIAGNOSIS — E66.01 OBESITY, MORBID, BMI 50 OR HIGHER: ICD-10-CM

## 2025-03-24 NOTE — PROGRESS NOTES
"Chief Complaint  Chief Complaint   Patient presents with    Primary Care Follow-Up     3 month     Skin Problem     Skin tag beside left nipple, been there about a month.        Subjective        Brad A Schoen is a 49 y.o. male who presents to [facility].  History of Present Illness    Chu is a 49 year old male here for multiple problems.      Hypertension  At last appointment 12/23 blood pressure was slightly high.  At that time was advised to monitor blood pressure and continue losartan-hydrochlorothiazide.  Patient states that he has not been checking his blood pressure at home  Has been taking his losartan-hydrochlorothiazide 50 mg-12.5 mg once daily      Skin tag   Noticed a skin tag adjacent to his left nipple about a month ago but he has not noticed before  Not causing any symptoms      Obesity  Was started on semaglutide 0.25 mg weekly at last appointment 12/23  Since that time he has been making changes to his diet.  Has a protein shake in the morning and has been trying to cut back on fast food.  No regular exercise but does do a lot of walking        Objective   BP (!) 136/102   Pulse 97   Ht 177.8 cm (70\")   Wt (!) 158 kg (348 lb 9.6 oz)   SpO2 97%   BMI 50.02 kg/m²     Estimated body mass index is 50.02 kg/m² as calculated from the following:    Height as of this encounter: 177.8 cm (70\").    Weight as of this encounter: 158 kg (348 lb 9.6 oz).     Physical Exam   GEN: In no acute distress, non toxic appearing  HEENT: MMM. EOMI.   CV: No extremity edema.   RESP: No signs of respiratory distress.  SKIN: There is a 2 mm pedunculated, flesh-colored lesion just lateral to his left nipple consistent with skin tag.  MSK: No deformity.   NEURO: Moves all extremities equally. Alert and appropriate             Result Review :              Assessment and Plan     Diagnoses and all orders for this visit:    1. Essential hypertension (Primary)  Chronic, uncontrolled " condition  Blood pressure is elevated today at 136/102  Discussed importance of monitoring blood pressure at home to get a more accurate idea of what his blood pressure is doing.  If blood pressure is consistently >140/90 it does put him at higher risk of heart disease/stroke in the future.  Patient verbalized understanding and will call/message the office if his blood pressure is running high at home.        2. Obesity, morbid, BMI 50 or higher  Has had some weight loss with diet changes as well as the semaglutide  Unfortunately, the compounding pharmacy will no longer be filling prescriptions for the semaglutide.  Discussed option for switching to compounded dulaglutide which he is in agreement.  Prescription for 1.5 mg weekly sent to pharmacy.  Advised to call the office in 1 month if tolerating and higher dose can be sent to the pharmacy.    3. Skin tag  Asymptomatic  Advised to monitor and if any changes may need to be reevaluated    Other orders  -     Dulaglutide 1.5 MG/0.5ML solution auto-injector; Inject 1.5 mg under the skin into the appropriate area as directed 1 (One) Time Per Week. Okay to add B6  Dispense: 2 mL; Refill: 0            Follow Up       Return in about 9 months (around 12/24/2025) for Annual physical.

## 2025-04-30 NOTE — TELEPHONE ENCOUNTER
This patient needs an appt per dr. Orlando he will not be using [facility] for compounds so patient needs to see MD

## 2025-05-02 ENCOUNTER — OFFICE VISIT (OUTPATIENT)
Dept: FAMILY MEDICINE CLINIC | Facility: CLINIC | Age: 50
End: 2025-05-02
Payer: COMMERCIAL

## 2025-05-02 VITALS
HEART RATE: 98 BPM | OXYGEN SATURATION: 97 % | DIASTOLIC BLOOD PRESSURE: 84 MMHG | HEIGHT: 70 IN | BODY MASS INDEX: 45.1 KG/M2 | SYSTOLIC BLOOD PRESSURE: 128 MMHG | WEIGHT: 315 LBS

## 2025-05-02 DIAGNOSIS — E78.2 MIXED HYPERLIPIDEMIA: ICD-10-CM

## 2025-05-02 DIAGNOSIS — R73.03 PREDIABETES: ICD-10-CM

## 2025-05-02 DIAGNOSIS — E66.01 MORBID OBESITY WITH BMI OF 45.0-49.9, ADULT: Primary | ICD-10-CM

## 2025-05-02 PROCEDURE — 99213 OFFICE O/P EST LOW 20 MIN: CPT | Performed by: STUDENT IN AN ORGANIZED HEALTH CARE EDUCATION/TRAINING PROGRAM

## 2025-05-02 NOTE — PROGRESS NOTES
"Chief Complaint  Chief Complaint   Patient presents with    Follow-up     Weight loss medication        Subjective        Brad A Schoen is a 49 y.o. male who presents to [facility].  History of Present Illness    Chu is a 49 year old male here for obesity.      Patient has lost about 20 pounds since starting semaglutide in December 2024.  He has been taking the 0.25 mg weekly dose.  He states it helped significantly with portion control.  He has also tried to make some changes with his diet such as eating more lean meats.  He has been doing more walking but other than that no regular exercise.        Objective   /84   Pulse 98   Ht 177.8 cm (70\")   Wt (!) 155 kg (342 lb)   SpO2 97%   BMI 49.07 kg/m²     Estimated body mass index is 49.07 kg/m² as calculated from the following:    Height as of this encounter: 177.8 cm (70\").    Weight as of this encounter: 155 kg (342 lb).     Physical Exam   GEN: In no acute distress, non toxic appearing  HEENT: MMM. EOMI.   CV: No extremity edema.   RESP: No signs of respiratory distress.  SKIN: No rashes  MSK: No deformity.   NEURO: Moves all extremities equally. Alert and appropriate             Result Review :              Assessment and Plan     Diagnoses and all orders for this visit:    1. Morbid obesity with BMI of 45.0-49.9, adult (Primary)  Patient has had some success already with low-dose semaglutide  Unfortunately, he will no longer be able to get compounded semaglutide.  Recommend that he contact his insurance company to ask which medications are covered for weight loss.    Discussed options including trying a medication through insurance or prescribing liraglutide through [facility].  Will send prescription for Zepbound to pharmacy and see if it can be approved with [place].  If unable to get weight loss medication through insurance he is interested in starting liraglutide at [facility].    -     Tirzepatide-Weight " Management (ZEPBOUND) 2.5 MG/0.5ML solution auto-injector; Inject 0.5 mL under the skin into the appropriate area as directed 1 (One) Time Per Week.  Dispense: 2 mL; Refill: 0              Follow Up     No follow-ups on file.

## 2025-05-05 ENCOUNTER — TELEPHONE (OUTPATIENT)
Dept: FAMILY MEDICINE CLINIC | Facility: CLINIC | Age: 50
End: 2025-05-05
Payer: COMMERCIAL

## 2025-05-05 DIAGNOSIS — E78.2 MIXED HYPERLIPIDEMIA: ICD-10-CM

## 2025-05-05 DIAGNOSIS — E66.01 MORBID OBESITY WITH BMI OF 45.0-49.9, ADULT: ICD-10-CM

## 2025-05-05 DIAGNOSIS — R73.03 PREDIABETES: ICD-10-CM

## 2025-05-05 NOTE — TELEPHONE ENCOUNTER
Caller: Schoen, Brad A    Relationship: Self    Best call back number: [phone]    What medication are you requesting: Tirzepatide-Weight Management (ZEPBOUND) 2.5 MG/0.5ML solution auto-injector     PATIENT IS CALLING HAS TO SWITCH PHARMACY DUE TO INSURANCE PLEASE SEND PRESCRIPTION TO BELOW LISTED PHARMACY     PATIENT STATES PRESCRIPTION HAS TO SAY REASON FOR WEIGHT LOSS     If a prescription is needed, what is your preferred pharmacy and phone number: [facility] [place], [place] - [address] [place] [address] [phone] [facility] [phone]              
Normal

## 2025-05-06 ENCOUNTER — OFFICE VISIT (OUTPATIENT)
Dept: FAMILY MEDICINE CLINIC | Facility: CLINIC | Age: 50
End: 2025-05-06
Payer: COMMERCIAL

## 2025-05-06 VITALS
HEART RATE: 107 BPM | SYSTOLIC BLOOD PRESSURE: 130 MMHG | DIASTOLIC BLOOD PRESSURE: 96 MMHG | OXYGEN SATURATION: 97 % | BODY MASS INDEX: 45.1 KG/M2 | HEIGHT: 70 IN | WEIGHT: 315 LBS

## 2025-05-06 DIAGNOSIS — E66.01 SEVERE OBESITY (BMI >= 40): Primary | ICD-10-CM

## 2025-05-06 NOTE — PROGRESS NOTES
"Chief Complaint  Chief Complaint   Patient presents with    Med Refill     Zepbound is being refused by insurance        Subjective        Brad A Schoen is a 49 y.o. male who presents to [facility].  History of Present Illness    Chu is a 49-year-old male here for obesity.      After last appointment patient was sent prescription for Zepbound.  He was told by his insurance company that this medication could be covered.  However, last night he received a phone call from his insurance stating that there was \"not enough information\" available for them to approve the medication.    Previously him and I had talked about liraglutide and he would like to get started on this.      Objective   /96   Pulse 107   Ht 177.8 cm (70\")   Wt (!) 156 kg (343 lb 12.8 oz)   SpO2 97%   BMI 49.33 kg/m²     Estimated body mass index is 49.33 kg/m² as calculated from the following:    Height as of this encounter: 177.8 cm (70\").    Weight as of this encounter: 156 kg (343 lb 12.8 oz).     Physical Exam   GEN: In no acute distress, non toxic appearing  HEENT: MMM. EOMI.   CV: No extremity edema.   RESP: No signs of respiratory distress.  SKIN: No rashes  MSK: No deformity.   NEURO: Moves all extremities equally. Alert and appropriate             Result Review :              Assessment and Plan     Diagnoses and all orders for this visit:    1. Severe obesity (BMI >= 40) (Primary)  Chronic, uncontrolled condition    Discussed options for starting liraglutide (compounded) versus waiting for denial letter and seeing if there is anything that can be done/documented in order for Zepbound to be approved.  Patient would like to go ahead and get started on the liraglutide and if medication is ineffective or he has any problems with it we will plan to bring denial letter to the office and see if we are able to get Zepbound approved.      As discussed in previous office visits, counseled on the importance " of healthy diet and exercise in order to achieve and sustain long-term weight loss.    Briefly discussed option for weight loss surgery.  Advised if he is ever interested in pursuing this further referral to bariatric surgery can be placed.    Other orders  -     Liraglutide (SAXENDA) 18 MG/3ML injection pen; Inject 1.2 mg under the skin into the appropriate area as directed Daily. Okay to add B6 or B12  Dispense: 6 mL; Refill: 0            Follow Up     No follow-ups on file.

## 2025-05-09 DIAGNOSIS — E66.01 SEVERE OBESITY (BMI >= 40): Primary | ICD-10-CM

## 2025-05-09 RX ORDER — PHENTERMINE HYDROCHLORIDE 15 MG/1
15 CAPSULE ORAL EVERY MORNING
Qty: 90 CAPSULE | Refills: 0 | Status: SHIPPED | OUTPATIENT
Start: 2025-05-09

## 2025-05-22 RX ORDER — LOSARTAN POTASSIUM AND HYDROCHLOROTHIAZIDE 12.5; 5 MG/1; MG/1
1 TABLET ORAL DAILY
Qty: 90 TABLET | Refills: 0 | Status: SHIPPED | OUTPATIENT
Start: 2025-05-22

## 2025-05-22 RX ORDER — ATORVASTATIN CALCIUM 20 MG/1
20 TABLET, FILM COATED ORAL DAILY
Qty: 90 TABLET | Refills: 1 | Status: SHIPPED | OUTPATIENT
Start: 2025-05-22

## 2025-05-27 ENCOUNTER — APPOINTMENT (OUTPATIENT)
Dept: CT IMAGING | Facility: HOSPITAL | Age: 50
End: 2025-05-27
Payer: COMMERCIAL

## 2025-05-27 ENCOUNTER — HOSPITAL ENCOUNTER (EMERGENCY)
Facility: HOSPITAL | Age: 50
Discharge: HOME | End: 2025-05-27
Admitting: EMERGENCY MEDICINE
Payer: COMMERCIAL

## 2025-05-27 VITALS
RESPIRATION RATE: 18 BRPM | TEMPERATURE: 98.1 F | HEART RATE: 88 BPM | HEIGHT: 70 IN | DIASTOLIC BLOOD PRESSURE: 93 MMHG | BODY MASS INDEX: 45.1 KG/M2 | OXYGEN SATURATION: 95 % | WEIGHT: 315 LBS | SYSTOLIC BLOOD PRESSURE: 127 MMHG

## 2025-05-27 DIAGNOSIS — R10.13 EPIGASTRIC PAIN: Primary | ICD-10-CM

## 2025-05-27 LAB
ALBUMIN SERPL-MCNC: 4.3 G/DL (ref 3.5–5.2)
ALBUMIN/GLOB SERPL: 1.7 G/DL
ALP SERPL-CCNC: 97 U/L (ref 39–117)
ALT SERPL W P-5'-P-CCNC: 45 U/L (ref 1–41)
ANION GAP SERPL CALCULATED.3IONS-SCNC: 13.4 MMOL/L (ref 5–15)
AST SERPL-CCNC: 27 U/L (ref 1–40)
BASOPHILS # BLD AUTO: 0.04 10*3/MM3 (ref 0–0.2)
BASOPHILS NFR BLD AUTO: 0.4 % (ref 0–1.5)
BILIRUB SERPL-MCNC: 0.5 MG/DL (ref 0–1.2)
BUN SERPL-MCNC: 16 MG/DL (ref 6–20)
BUN/CREAT SERPL: 17.8 (ref 7–25)
CALCIUM SPEC-SCNC: 9.2 MG/DL (ref 8.6–10.5)
CHLORIDE SERPL-SCNC: 101 MMOL/L (ref 98–107)
CO2 SERPL-SCNC: 21.6 MMOL/L (ref 22–29)
CREAT SERPL-MCNC: 0.9 MG/DL (ref 0.76–1.27)
DEPRECATED RDW RBC AUTO: 47.1 FL (ref 37–54)
EGFRCR SERPLBLD CKD-EPI 2021: 104 ML/MIN/1.73
EOSINOPHIL # BLD AUTO: 0.22 10*3/MM3 (ref 0–0.4)
EOSINOPHIL NFR BLD AUTO: 2.1 % (ref 0.3–6.2)
ERYTHROCYTE [DISTWIDTH] IN BLOOD BY AUTOMATED COUNT: 12.9 % (ref 12.3–15.4)
GLOBULIN UR ELPH-MCNC: 2.5 GM/DL
GLUCOSE SERPL-MCNC: 119 MG/DL (ref 65–99)
HCT VFR BLD AUTO: 43.4 % (ref 37.5–51)
HGB BLD-MCNC: 15.1 G/DL (ref 13–17.7)
HOLD SPECIMEN: NORMAL
IMM GRANULOCYTES # BLD AUTO: 0.03 10*3/MM3 (ref 0–0.05)
IMM GRANULOCYTES NFR BLD AUTO: 0.3 % (ref 0–0.5)
LIPASE SERPL-CCNC: 32 U/L (ref 13–60)
LYMPHOCYTES # BLD AUTO: 2.11 10*3/MM3 (ref 0.7–3.1)
LYMPHOCYTES NFR BLD AUTO: 20.1 % (ref 19.6–45.3)
MCH RBC QN AUTO: 34.3 PG (ref 26.6–33)
MCHC RBC AUTO-ENTMCNC: 34.8 G/DL (ref 31.5–35.7)
MCV RBC AUTO: 98.6 FL (ref 79–97)
MONOCYTES # BLD AUTO: 0.78 10*3/MM3 (ref 0.1–0.9)
MONOCYTES NFR BLD AUTO: 7.4 % (ref 5–12)
NEUTROPHILS NFR BLD AUTO: 69.7 % (ref 42.7–76)
NEUTROPHILS NFR BLD AUTO: 7.34 10*3/MM3 (ref 1.7–7)
NRBC BLD AUTO-RTO: 0 /100 WBC (ref 0–0.2)
PLATELET # BLD AUTO: 301 10*3/MM3 (ref 140–450)
PMV BLD AUTO: 9 FL (ref 6–12)
POTASSIUM SERPL-SCNC: 3.8 MMOL/L (ref 3.5–5.2)
PROT SERPL-MCNC: 6.8 G/DL (ref 6–8.5)
RBC # BLD AUTO: 4.4 10*6/MM3 (ref 4.14–5.8)
SODIUM SERPL-SCNC: 136 MMOL/L (ref 136–145)
TROPONIN T SERPL HS-MCNC: 13 NG/L
WBC NRBC COR # BLD AUTO: 10.52 10*3/MM3 (ref 3.4–10.8)
WHOLE BLOOD HOLD COAG: NORMAL

## 2025-05-27 PROCEDURE — 85025 COMPLETE CBC W/AUTO DIFF WBC: CPT | Performed by: NURSE PRACTITIONER

## 2025-05-27 PROCEDURE — 84484 ASSAY OF TROPONIN QUANT: CPT | Performed by: NURSE PRACTITIONER

## 2025-05-27 PROCEDURE — 80053 COMPREHEN METABOLIC PANEL: CPT | Performed by: NURSE PRACTITIONER

## 2025-05-27 PROCEDURE — 93005 ELECTROCARDIOGRAM TRACING: CPT | Performed by: NURSE PRACTITIONER

## 2025-05-27 PROCEDURE — 83690 ASSAY OF LIPASE: CPT | Performed by: NURSE PRACTITIONER

## 2025-05-27 PROCEDURE — 74176 CT ABD & PELVIS W/O CONTRAST: CPT

## 2025-05-27 PROCEDURE — 99284 EMERGENCY DEPT VISIT MOD MDM: CPT

## 2025-05-27 RX ORDER — PANTOPRAZOLE SODIUM 40 MG/1
40 TABLET, DELAYED RELEASE ORAL DAILY
Qty: 30 TABLET | Refills: 0 | Status: SHIPPED | OUTPATIENT
Start: 2025-05-27

## 2025-05-27 RX ORDER — SUCRALFATE 1 G/1
1 TABLET ORAL 4 TIMES DAILY
Qty: 120 TABLET | Refills: 0 | Status: SHIPPED | OUTPATIENT
Start: 2025-05-27 | End: 2025-06-26

## 2025-05-27 RX ORDER — SODIUM CHLORIDE 0.9 % (FLUSH) 0.9 %
10 SYRINGE (ML) INJECTION AS NEEDED
Status: DISCONTINUED | OUTPATIENT
Start: 2025-05-27 | End: 2025-05-27 | Stop reason: HOSPADM

## 2025-05-27 NOTE — DISCHARGE INSTRUCTIONS
Medications as directed.  Avoid any greasy fried fatty spicy foods chocolate peppermint alcohol anti-inflammatories or dairy.  Follow-up with your family doctor.  Gastroenterology referral was placed to call for an appointment.  Return for any new or worsening symptoms

## 2025-05-27 NOTE — ED NOTES
"Pt c/o upper abd /epigastric pain since Saturday night, describes as constant soreness. Denies any N/V/D but reports is constipated. Pt reports last BM was this morning but very small, has taken gasX and peptobismol. Pt reports sometimes the pain is in his back as well and \"feels like my back needs popped\".  "

## 2025-05-27 NOTE — Clinical Note
[facility] EMERGENCY DEPARTMENT  [address] [place] [address]  Phone: [phone]    Brad Schoen was seen and treated in our emergency department on 5/27/2025.  He may return to work on 05/28/2025.         Thank you for choosing [facility].    Lola Jimenez RN

## 2025-05-27 NOTE — ED PROVIDER NOTES
Subjective   History of Present Illness  Chief complaint: Epigastric pain      Context: Patient is a 49-year-old male who presents with complaints of epigastric pain and constipation for the last 2 days.  He denies any nausea vomiting or diarrhea.  No urinary complaints or testicular pain.  States he had a small bowel movement yesterday.  He states he was also chopping wood and doing a lot of yard work the day before his symptoms started.  Denies any chest pain shortness of breath or diaphoresis.  Only new medication is phentermine a couple weeks ago        PCP:  kendra        Review of Systems   Constitutional:  Negative for fever.       Past Medical History:   Diagnosis Date    Anxiety     Cervical disc disorder     DDD (degenerative disc disease), lumbar     Depression     Indigestion     occ       Allergies   Allergen Reactions    Contrast Dye (Echo Or Unknown Ct/Mr) Rash       Past Surgical History:   Procedure Laterality Date    ANTERIOR CERVICAL DISCECTOMY W/ FUSION Left 2018    Procedure: CERVICAL DISCECTOMY ANTERIOR WITH ATLANTIS cervical 456, possible c5 corpectomy with anterior fixation from c4-6, use of allograft;  Surgeon: Larry Crowder IV, MD;  Location: [facility];  Service: Neurosurgery    BACK SURGERY       Dr. Keller  & Dr. Corral     FOOT ARTHROPLASTY Right 2024    KNEE SURGERY Right     LAPAROSCOPIC CHOLECYSTECTOMY      NECK SURGERY  2018       Family History   Problem Relation Age of Onset    No Known Problems Mother     Malig Hyperthermia Neg Hx        Social History     Socioeconomic History    Marital status:    Tobacco Use    Smoking status: Former     Current packs/day: 0.00     Average packs/day: 0.3 packs/day for 32.0 years (8.0 ttl pk-yrs)     Types: Cigarettes     Start date:      Quit date:      Years since quittin.4     Passive exposure: Past    Smokeless tobacco: Never    Tobacco comments:     aprox 10 cigarettes per week   Vaping  Use    Vaping status: Never Used   Substance and Sexual Activity    Alcohol use: Yes     Comment: occ    Drug use: No    Sexual activity: Not Currently           Objective   Physical Exam    Vital signs and triage nurse note reviewed.  Constitutional: Awake, alert; well-developed and well-nourished. No acute distress is noted.  HEENT: Normocephalic, atraumatic;  with intact EOM; oropharynx is pink and moist without exudate or erythema.  Neck: Supple,   Cardiovascular: Regular rate and rhythm, normal S1-S2.  Pulmonary: Respiratory effort regular nonlabored, breath sounds clear to auscultation all fields.  Abdomen: Soft, patient notes pain is at the epigastrium although it is nontender to palpation nondistended with normoactive bowel sounds; no rebound or guarding.  Negative Lehman McBurney no peritonitis  Musculoskeletal: Independent range of motion of all extremities with no palpable tenderness or edema.  Neuro: Alert oriented x3, speech is clear and appropriate, GCS 15  Skin:  Fleshtone warm, dry, intact; no erythematous or petechial rash or lesion      Procedures           ED Course        Labs Reviewed   COMPREHENSIVE METABOLIC PANEL - Abnormal; Notable for the following components:       Result Value    Glucose 119 (*)     CO2 21.6 (*)     ALT (SGPT) 45 (*)     All other components within normal limits    Narrative:     GFR Categories in Chronic Kidney Disease (CKD)              GFR Category          GFR (mL/min/1.73)    Interpretation  G1                    90 or greater        Normal or high (1)  G2                    60-89                Mild decrease (1)  G3a                   45-59                Mild to moderate decrease  G3b                   30-44                Moderate to severe decrease  G4                    15-29                Severe decrease  G5                    14 or less           Kidney failure    (1)In the absence of evidence of kidney disease, neither GFR category G1 or G2 fulfill the  criteria for CKD.    eGFR calculation 2021 CKD-EPI creatinine equation, which does not include race as a factor   CBC WITH AUTO DIFFERENTIAL - Abnormal; Notable for the following components:    MCV 98.6 (*)     MCH 34.3 (*)     Neutrophils, Absolute 7.34 (*)     All other components within normal limits   LIPASE - Normal   TROPONIN - Normal    Narrative:     High Sensitive Troponin T Reference Range:  <14.0 ng/L- Negative Female for AMI  <22.0 ng/L- Negative Male for AMI  >=14 - Abnormal Female indicating possible myocardial injury.  >=22 - Abnormal Male indicating possible myocardial injury.   Clinicians would have to utilize clinical acumen, EKG, Troponin, and serial changes to determine if it is an Acute Myocardial Infarction or myocardial injury due to an underlying chronic condition.        HIGH SENSITIVITIY TROPONIN T 1HR   CBC AND DIFFERENTIAL    Narrative:     The following orders were created for panel order CBC & Differential.  Procedure                               Abnormality         Status                     ---------                               -----------         ------                     CBC Auto Differential[[id number]]        Abnormal            Final result                 Please view results for these tests on the individual orders.   EXTRA TUBES    Narrative:     The following orders were created for panel order Extra Tubes.  Procedure                               Abnormality         Status                     ---------                               -----------         ------                     Gold Top - SST[[id number]]                                   Final result               Light Blue Top[[id number]]                                   Final result                 Please view results for these tests on the individual orders.   GOLD TOP - SST   LIGHT BLUE TOP     Medications   sodium chloride 0.9 % flush 10 mL (has no administration in time range)     CT Abdomen Pelvis Without  "Contrast  Result Date: 5/27/2025  Impression: 1. Mild fat stranding adjacent to the proximal pancreas and duodenal C-loop. Findings may be due to mild pancreatitis or duodenitis. Clinical and laboratory correlation are recommended. 2. Hepatomegaly with steatosis. 3. Mild sigmoid diverticulosis. Electronically Signed: Rossi Abebe MD  5/27/2025 9:35 AM EDT  Workstation ID: [id number]    Prior to Admission medications    Medication Sig Start Date End Date Taking? Authorizing Provider   atorvastatin (LIPITOR) 20 MG tablet Take 1 tablet by mouth Daily. 5/22/25   Jeremías Orlando DO   azelastine (ASTELIN) 0.1 % nasal spray Administer 2 sprays into the nostril(s) as directed by provider 2 (Two) Times a Day. Use in each nostril as directed 1/13/25   Jeremías Orlando DO   fluticasone (FLONASE) 50 MCG/ACT nasal spray Administer 2 sprays into the nostril(s) as directed by provider Daily. 1/6/25   Karri Watson MD   losartan-hydrochlorothiazide (HYZAAR) 50-12.5 MG per tablet Take 1 tablet by mouth Daily. 5/22/25   Jeremías Orlando DO   pantoprazole (PROTONIX) 40 MG EC tablet Take 1 tablet by mouth Daily. 5/27/25   Sil Hurley APRN   phentermine 15 MG capsule Take 1 capsule by mouth Every Morning. 5/9/25   Jeremías Orlando DO   sucralfate (CARAFATE) 1 g tablet Take 1 tablet by mouth 4 (Four) Times a Day for 30 days. 5/27/25 6/26/25  Sil Hurley APRN   omeprazole (priLOSEC) 20 MG capsule Take 1 capsule by mouth Daily.  5/27/25  ProviderGrover MD                                                    Medical Decision Making      /87   Pulse 93   Temp 98.1 °F (36.7 °C) (Oral)   Resp 18   Ht 177.8 cm (70\")   Wt (!) 155 kg (342 lb 6 oz)   SpO2 96%   BMI 49.13 kg/m²         Radiology interpretation: CT reviewed and interpreted by tanner: Subtle stranding around the pancreas  Further interpretation by radiologist as above  Lab interpretation:  Labs all viewed by me and significant " for, glucose 119 troponin 13 lipase 32 white count 10.5  EKG viewed by me and interpreted by Dr. Woods, sinus rhythm rate of 94 without acute ST depression or elevation  comparison: 10/5/2015 sinus rhythm rate of 73      Appropriate PPE worn during exam.  Patient had an IV established labs CT obtained evaluate for pancreatitis reflux, patient does not have any typical anginal symptoms although troponin and EKG were within normal limits.  On reexam he is well-appearing in no acute distress.  We discussed his abnormal CT and he was offered admission and patient politely declined preferring to go home and follow-up as an outpatient.  He will be started on Protonix and Carafate.  Referral to GI was placed.  He has to follow-up with his PCP for further evaluation management.  He states he does have a history of ulcers and increased rest recently.  Discharge verbalizes when to return emergently to the ER      i discussed findings with patient who voices understanding of discharge instructions, signs and symptoms requiring return to ED; discharged improved and in stable condition with follow up for re-evaluation.  This document is intended for medical expert use only. Reading of this document by patients and/or patient's family without participating medical staff guidance may result in misinterpretation and unintended morbidity.  Any interpretation of such data is the responsibility of the patient and/or family member responsible for the patient in concert with their primary or specialist providers, not to be left for sources of online searches such as [employer], [employer] or similar queries. Relying on these approaches to knowledge may result in misinterpretation, misguided goals of care and even death should patients or family members try recommendations outside of the realm of professional medical care in a supervised inpatient environment.         Problems Addressed:  Epigastric pain: complicated acute illness or  injury    Amount and/or Complexity of Data Reviewed  Labs: ordered.  Radiology: ordered.  ECG/medicine tests: ordered.    Risk  Prescription drug management.        Final diagnoses:   Epigastric pain       ED Disposition  ED Disposition       ED Disposition   Discharge    Condition   Stable    Comment   --               Jeremías Orlando DO  [address]  [address]  [place] [address]  [phone]    Schedule an appointment as soon as possible for a visit       Jac Stephen MD  [address] [facility] [place] [address] [phone]    Schedule an appointment as soon as possible for a visit   GI         Medication List        New Prescriptions      pantoprazole 40 MG EC tablet  Commonly known as: PROTONIX  Take 1 tablet by mouth Daily.     sucralfate 1 g tablet  Commonly known as: CARAFATE  Take 1 tablet by mouth 4 (Four) Times a Day for 30 days.            Stop      omeprazole 20 MG capsule  Commonly known as: priLOSEC               Where to Get Your Medications        These medications were sent to [id number] - [place], [place] - [address] AT SEC OF [facility] [facility] [address] - [phone] [place] [phone]   [address] [place] [address]      Phone: [phone]   pantoprazole 40 MG EC tablet  sucralfate 1 g tablet            Sil Hurley, APRN  05/27/25 1036

## 2025-05-28 LAB
QT INTERVAL: 355 MS
QTC INTERVAL: 444 MS

## 2025-05-29 ENCOUNTER — TELEPHONE (OUTPATIENT)
Dept: FAMILY MEDICINE CLINIC | Facility: CLINIC | Age: 50
End: 2025-05-29
Payer: COMMERCIAL

## 2025-05-29 NOTE — TELEPHONE ENCOUNTER
Provider: DR RICHTER    Caller: [facility]    Relationship to Patient: Other    Reason for Call: BRAD FROM THE [facility]ICAL [facility] IS CALLING ABOUT A GASTROENTEROLOGY REFERRAL FROM THE ER ON THIS PATIENT.     THIS WAS SENT TO THE HUB, BUT THE HUB DOES NOT SCHEDULE FOR GASTROENTEROLOGY IN [place].     THE DISCHARGE SUMMARY SAYS FOR PATIENT TO FOLLOW UP WITH DR ABDIEL COVINGTON WITH [facility] LOCATED IN [place].     [phone] IS THE NUMBER FOR THIS OFFICE.     PLEASE FOLLOW UP WITH THIS PROVIDER OR REACH OUT TO THE MEDICAL HUB WITH ANY QUESTIONS OR CONCERNS.

## 2025-05-30 DIAGNOSIS — R10.13 EPIGASTRIC PAIN: Primary | ICD-10-CM

## 2025-08-07 RX ORDER — ATORVASTATIN CALCIUM 20 MG/1
20 TABLET, FILM COATED ORAL DAILY
Qty: 90 TABLET | Refills: 1 | Status: SHIPPED | OUTPATIENT
Start: 2025-08-07

## 2025-08-07 RX ORDER — LOSARTAN POTASSIUM AND HYDROCHLOROTHIAZIDE 12.5; 5 MG/1; MG/1
1 TABLET ORAL DAILY
Qty: 90 TABLET | Refills: 0 | Status: SHIPPED | OUTPATIENT
Start: 2025-08-07

## 2025-08-20 ENCOUNTER — OFFICE VISIT (OUTPATIENT)
Dept: FAMILY MEDICINE CLINIC | Facility: CLINIC | Age: 50
End: 2025-08-20
Payer: COMMERCIAL

## 2025-08-20 VITALS
HEIGHT: 70 IN | DIASTOLIC BLOOD PRESSURE: 92 MMHG | OXYGEN SATURATION: 96 % | HEART RATE: 99 BPM | SYSTOLIC BLOOD PRESSURE: 148 MMHG | WEIGHT: 315 LBS | BODY MASS INDEX: 45.1 KG/M2

## 2025-08-20 DIAGNOSIS — E66.01 SEVERE OBESITY (BMI >= 40): Primary | ICD-10-CM

## (undated) DEVICE — CONN TBG Y 5 IN 1 LF STRL

## (undated) DEVICE — MARKR SKIN W/RULR AND LBL

## (undated) DEVICE — DISPOSABLE 9450003 ELECTRO TWST PAIR 8CH

## (undated) DEVICE — DRP MICROSCP LEICA W/GLASS LENS

## (undated) DEVICE — ELECTRD BLD EDGE/INSUL1P 2.4X5.1MM STRL

## (undated) DEVICE — SPNG DISECTOR KTNER XRAY COTN 1/4X9/16IN PK/5

## (undated) DEVICE — MAGNETIC DRAPE: Brand: DEVON

## (undated) DEVICE — 4.0MM PRECISION ROUND

## (undated) DEVICE — GLV SURG BIOGEL LTX PF 8 1/2

## (undated) DEVICE — 3M™ STERI-DRAPE™ INSTRUMENT POUCH 1018: Brand: STERI-DRAPE™

## (undated) DEVICE — DRP C/ARM 41X74IN

## (undated) DEVICE — ADHS SKIN DERMABOND TOP ADVANCED

## (undated) DEVICE — SMOKE EVACUATION TUBING WITH 7/8 IN TO 1/4 IN REDUCER: Brand: BUFFALO FILTER

## (undated) DEVICE — 3.0MM PRECISION NEURO (MATCH HEAD)

## (undated) DEVICE — SUT MNCRYL PLS ANTIB UD 4/0 PS2 18IN

## (undated) DEVICE — SUT SILK 2/0 TIES 18IN A185H

## (undated) DEVICE — SPNG GZ WOVN 4X4IN 12PLY 10/BX STRL

## (undated) DEVICE — APPL CHLORAPREP W/TINT 10.5ML PERC STRL

## (undated) DEVICE — GOWN,PREVENTION PLUS,XLARGE,STERILE: Brand: MEDLINE

## (undated) DEVICE — SOL ISO/ALC RUB 70PCT 4OZ

## (undated) DEVICE — DISPOSABLE BIPOLAR CABLE 12FT. (3.6M): Brand: KIRWAN

## (undated) DEVICE — ANTIBACTERIAL UNDYED BRAIDED (POLYGLACTIN 910), SYNTHETIC ABSORBABLE SUTURE: Brand: COATED VICRYL

## (undated) DEVICE — NDL SPINE 20G 3 1/2 YEL STRL 1P/U

## (undated) DEVICE — GLV SURG TRIUMPH CLASSIC PF LTX 8.5 STRL

## (undated) DEVICE — TUBING, SUCTION, 1/4" X 20', STRAIGHT: Brand: MEDLINE INDUSTRIES, INC.

## (undated) DEVICE — NDL SPINE 22G 5IN BLK

## (undated) DEVICE — LIMB HOLDER, WRIST/ANKLE: Brand: DEROYAL

## (undated) DEVICE — NDL HYPO PRECISIONGLIDE REG 25G 1 1/2

## (undated) DEVICE — GLV SURG SENSICARE ALOE LF PF SZ7.5 GRN

## (undated) DEVICE — COLR CERV VISTA TX 1SZ ADJ

## (undated) DEVICE — GLV SURG BIOGEL LTX PF 7 1/2

## (undated) DEVICE — CODMAN® SURGICAL PATTIES 3/4" X 3/4" (1.91CM X 1.91CM): Brand: CODMAN®

## (undated) DEVICE — DRP SLUSH WARMR MACH 52X66IN OM-ORS-301

## (undated) DEVICE — 3M™ IOBAN™ 2 ANTIMICROBIAL INCISE DRAPE 6640EZ: Brand: IOBAN™ 2

## (undated) DEVICE — PK NEURO SPINE 40

## (undated) DEVICE — GLV SURG BIOGEL LTX PF 8